# Patient Record
Sex: FEMALE | Race: WHITE | NOT HISPANIC OR LATINO | Employment: FULL TIME | URBAN - METROPOLITAN AREA
[De-identification: names, ages, dates, MRNs, and addresses within clinical notes are randomized per-mention and may not be internally consistent; named-entity substitution may affect disease eponyms.]

---

## 2020-12-18 ENCOUNTER — APPOINTMENT (OUTPATIENT)
Dept: RADIOLOGY | Facility: MEDICAL CENTER | Age: 35
End: 2020-12-18
Payer: COMMERCIAL

## 2021-01-08 ENCOUNTER — HOSPITAL ENCOUNTER (OUTPATIENT)
Dept: RADIOLOGY | Facility: MEDICAL CENTER | Age: 36
End: 2021-01-08
Attending: PHYSICIAN ASSISTANT | Admitting: PHYSICIAN ASSISTANT
Payer: COMMERCIAL

## 2021-01-08 DIAGNOSIS — R10.84 ABDOMINAL PAIN, GENERALIZED: ICD-10-CM

## 2021-01-08 DIAGNOSIS — K59.00 CONSTIPATION, UNSPECIFIED CONSTIPATION TYPE: ICD-10-CM

## 2021-01-08 DIAGNOSIS — K21.9 GASTROESOPHAGEAL REFLUX DISEASE, UNSPECIFIED WHETHER ESOPHAGITIS PRESENT: ICD-10-CM

## 2021-01-08 DIAGNOSIS — A08.11 EPIDEMIC VOMITING SYNDROME: ICD-10-CM

## 2021-01-08 DIAGNOSIS — K52.9 INFLAMMATORY BOWEL DISEASE: ICD-10-CM

## 2021-01-08 PROCEDURE — A9541 TC99M SULFUR COLLOID: HCPCS

## 2021-06-18 ENCOUNTER — HOSPITAL ENCOUNTER (OUTPATIENT)
Dept: LAB | Facility: MEDICAL CENTER | Age: 36
End: 2021-06-18
Attending: STUDENT IN AN ORGANIZED HEALTH CARE EDUCATION/TRAINING PROGRAM
Payer: COMMERCIAL

## 2021-08-20 ENCOUNTER — OFFICE VISIT (OUTPATIENT)
Dept: ENDOCRINOLOGY | Facility: MEDICAL CENTER | Age: 36
End: 2021-08-20
Attending: NURSE PRACTITIONER
Payer: COMMERCIAL

## 2021-08-20 VITALS
SYSTOLIC BLOOD PRESSURE: 136 MMHG | BODY MASS INDEX: 40.55 KG/M2 | DIASTOLIC BLOOD PRESSURE: 74 MMHG | HEIGHT: 66 IN | OXYGEN SATURATION: 95 % | RESPIRATION RATE: 16 BRPM | WEIGHT: 252.3 LBS | HEART RATE: 94 BPM

## 2021-08-20 DIAGNOSIS — D35.02 ADENOMA OF LEFT ADRENAL GLAND: ICD-10-CM

## 2021-08-20 DIAGNOSIS — E55.9 VITAMIN D DEFICIENCY: ICD-10-CM

## 2021-08-20 LAB
HBA1C MFR BLD: 5.1 % (ref 0–5.6)
INT CON NEG: NORMAL
INT CON POS: NORMAL

## 2021-08-20 PROCEDURE — 83036 HEMOGLOBIN GLYCOSYLATED A1C: CPT | Performed by: NURSE PRACTITIONER

## 2021-08-20 PROCEDURE — 99214 OFFICE O/P EST MOD 30 MIN: CPT | Performed by: NURSE PRACTITIONER

## 2021-08-20 PROCEDURE — 99212 OFFICE O/P EST SF 10 MIN: CPT | Performed by: NURSE PRACTITIONER

## 2021-08-20 RX ORDER — TOPIRAMATE 25 MG/1
25 TABLET ORAL
COMMUNITY
Start: 2021-06-20 | End: 2021-10-15

## 2021-08-20 RX ORDER — FLUTICASONE PROPIONATE 50 MCG
2 SPRAY, SUSPENSION (ML) NASAL
COMMUNITY
Start: 2021-06-28

## 2021-08-20 RX ORDER — ZONISAMIDE 100 MG/1
100 CAPSULE ORAL
COMMUNITY
Start: 2021-06-17

## 2021-08-20 RX ORDER — ALBUTEROL SULFATE 90 UG/1
AEROSOL, METERED RESPIRATORY (INHALATION)
COMMUNITY
Start: 2021-06-27 | End: 2022-09-16 | Stop reason: SDUPTHER

## 2021-08-20 RX ORDER — MONTELUKAST SODIUM 10 MG/1
10 TABLET ORAL
COMMUNITY
Start: 2021-05-29 | End: 2021-10-15

## 2021-08-20 RX ORDER — AMITRIPTYLINE HYDROCHLORIDE 25 MG/1
TABLET, FILM COATED ORAL
COMMUNITY
Start: 2021-08-11 | End: 2021-10-15 | Stop reason: SDUPTHER

## 2021-08-20 ASSESSMENT — PATIENT HEALTH QUESTIONNAIRE - PHQ9: CLINICAL INTERPRETATION OF PHQ2 SCORE: 0

## 2021-08-20 NOTE — PROGRESS NOTES
Chief Complaint: Initial consultation for adrenal lesion. Referred by Dr. Manzano.      HPI:     Priyanka Carmona is a 35 y.o. female here for initial consultation for adrenal lesion.      Patient reports increased anxiety and panic disorders, palpitations, cold intolerance, increased facial hair growth, weight fluctuates between losing and gaining. Average weight 250. Daily headaches-Referral to neurologist to r/o migraines and possible seizure activity.     Started and failed imitrex and topamax. Currently taking zonegran patient states she doesn't feel better but the headaches are less intense currently.     Menstrual cycle every 2 weeks for several months. Increased bleeding   Last pelvic exam was a year ago.     History of anxiety and panic disorders doubled with chronic migraines.     Patient reports history headaches, denies vision difficulties. Denies heat intolerance, diabetes or low potassium.     Labs 05/27/2020: TSH 0.77, potassium 4.4.   Labs completed 07/31/2020: Glucose 120    CT of Abd/Pelvis 09/11/2020: 3 cm hypodense solid left adrenal gland lesion more likely benign adenoma.     MRI Abd W &W/O completed at [facility] 11/13/2020: 2 cm x 2.6 cm benign left adrenal adenoma with benign fatty content.     POC A1c 08/20/2021: 5.1%.    Patient's medications, allergies, and social histories were reviewed and updated as appropriate.      ROS:       CONS:     No fever, no chills   EYES:     No diplopia, no blurry vision   CV:           No chest pain, no palpitations   PULM:     No SOB, no cough, no hemoptysis.   GI:            No nausea, no vomiting, no diarrhea, no constipation   ENDO:     No polyuria, no polydipsia, no heat intolerance, no cold intolerance     Past Medical History:  Problem List:  There are no relevant problems documented for this patient.      Past Surgical History:  No past surgical history on file.     Allergies:  Patient has no allergy information on record.     Social  "History:  Social History     Tobacco Use   • Smoking status: Never Smoker   • Smokeless tobacco: Never Used   Substance Use Topics   • Alcohol use: Never   • Drug use: Never        Family History:   family history is not on file.      PHYSICAL EXAM:   Vital signs: /74 (BP Location: Left arm, Patient Position: Sitting, BP Cuff Size: Large adult)   Pulse 94   Resp 16   Ht 1.676 m (5' 6\")   Wt 114 kg (252 lb 4.8 oz)   SpO2 95%   BMI 40.72 kg/m²   GENERAL: Well-developed, well-nourished in no apparent distress.   EYE:  No ocular asymmetry, PERRLA  HENT: Pink, moist mucous membranes.    NECK: No thyromegaly.   CARDIOVASCULAR:  No murmurs  LUNGS: Clear breath sounds  ABDOMEN: Soft, nontender   EXTREMITIES: No clubbing, cyanosis, or edema.   NEUROLOGICAL: No gross focal motor abnormalities   LYMPH: No cervical adenopathy palpated.   SKIN: No rashes, lesions.       Labs:  No results found for: WBC, RBC, HEMOGLOBIN, MCV, MCH, MCHC, RDW, MPV    No results found for: SODIUM, POTASSIUM, CHLORIDE, CO2, ANION, GLUCOSE, BUN, CREATININE, CALCIUM, ASTSGOT, ALTSGPT, TBILIRUBIN, ALBUMIN, TOTPROTEIN, GLOBULIN, AGRATIO    No results found for: TSHULTRASEN  No results found for: FREEDIR  No results found for: FREET3  No results found for: THYSTIMIG      Imaging:    ASSESSMENT/PLAN:   1. Adenoma of left adrenal gland  Stable.   BP slightly elevated.   Incidentaloma found in August 2020.   Conclusive adrenal work up was not started.     Recommend Complete Hormone Evaluation.   Future Lab Orders:    - T3 FREE; Future  - FREE THYROXINE; Future  - TSH; Future  - VITAMIN D,25 HYDROXY; Future  - THYROID PEROXIDASE  (TPO) AB; Future  - FOLATE; Future  - FERRITIN; Future  - PROLACTIN; Future  - CORTISOL; Future  - ESTRADIOL; Future  - FSH; Future  - IGF-1 SOMATOMEDIN; Future  - LUTEINIZING HORMONE SERUM; Future  - Comp Metabolic Panel; Future  - TESTOSTERONE, FREE AND TOTAL; Future  - ACTH; Future    2. Vitamin D deficiency  Unstable. "   Require current vitamin D level assay to assessment supplementation requirement.     Furture Order.   - VITAMIN D,25 HYDROXY; Future    Complete labs now.   Next appointment in 3 months.    Thank you kindly for allowing me to participate in the endocrine care plan for this patient.    Mayra Ferguson, APRN  08/20/21    CC:   Tori Manzano M.D. (Inactive)

## 2021-08-21 ENCOUNTER — HOSPITAL ENCOUNTER (OUTPATIENT)
Dept: LAB | Facility: MEDICAL CENTER | Age: 36
End: 2021-08-21
Payer: COMMERCIAL

## 2021-08-21 ENCOUNTER — HOSPITAL ENCOUNTER (OUTPATIENT)
Dept: LAB | Facility: MEDICAL CENTER | Age: 36
End: 2021-08-21
Attending: NURSE PRACTITIONER
Payer: COMMERCIAL

## 2021-08-21 DIAGNOSIS — D35.02 ADENOMA OF LEFT ADRENAL GLAND: ICD-10-CM

## 2021-08-21 DIAGNOSIS — E55.9 VITAMIN D DEFICIENCY: ICD-10-CM

## 2021-08-21 LAB
25(OH)D3 SERPL-MCNC: 16 NG/ML (ref 30–100)
ALBUMIN SERPL BCP-MCNC: 4.2 G/DL (ref 3.2–4.9)
ALBUMIN/GLOB SERPL: 1.6 G/DL
ALP SERPL-CCNC: 70 U/L (ref 30–99)
ALT SERPL-CCNC: 13 U/L (ref 2–50)
ANION GAP SERPL CALC-SCNC: 11 MMOL/L (ref 7–16)
AST SERPL-CCNC: 11 U/L (ref 12–45)
BILIRUB SERPL-MCNC: 0.2 MG/DL (ref 0.1–1.5)
BUN SERPL-MCNC: 14 MG/DL (ref 8–22)
CALCIUM SERPL-MCNC: 9.1 MG/DL (ref 8.5–10.5)
CHLORIDE SERPL-SCNC: 98 MMOL/L (ref 96–112)
CO2 SERPL-SCNC: 23 MMOL/L (ref 20–33)
CORTIS SERPL-MCNC: 4.1 UG/DL (ref 0–23)
CREAT SERPL-MCNC: 0.66 MG/DL (ref 0.5–1.4)
FERRITIN SERPL-MCNC: 75 NG/ML (ref 10–291)
FOLATE SERPL-MCNC: 10.6 NG/ML
FSH SERPL-ACNC: 5.4 MIU/ML
GLOBULIN SER CALC-MCNC: 2.7 G/DL (ref 1.9–3.5)
GLUCOSE SERPL-MCNC: 84 MG/DL (ref 65–99)
LH SERPL-ACNC: 3.2 IU/L
POTASSIUM SERPL-SCNC: 3.9 MMOL/L (ref 3.6–5.5)
PROLACTIN SERPL-MCNC: 8.36 NG/ML (ref 2.8–26)
PROT SERPL-MCNC: 6.9 G/DL (ref 6–8.2)
SODIUM SERPL-SCNC: 132 MMOL/L (ref 135–145)
T3FREE SERPL-MCNC: 2.86 PG/ML (ref 2–4.4)
T4 FREE SERPL-MCNC: 1.01 NG/DL (ref 0.93–1.7)
T4 FREE SERPL-MCNC: 1.02 NG/DL (ref 0.93–1.7)
TSH SERPL DL<=0.005 MIU/L-ACNC: 1.02 UIU/ML (ref 0.38–5.33)
TSH SERPL DL<=0.005 MIU/L-ACNC: 1.04 UIU/ML (ref 0.38–5.33)

## 2021-08-21 PROCEDURE — 82670 ASSAY OF TOTAL ESTRADIOL: CPT

## 2021-08-21 PROCEDURE — 83001 ASSAY OF GONADOTROPIN (FSH): CPT

## 2021-08-21 PROCEDURE — 80053 COMPREHEN METABOLIC PANEL: CPT

## 2021-08-21 PROCEDURE — 82728 ASSAY OF FERRITIN: CPT

## 2021-08-21 PROCEDURE — 84481 FREE ASSAY (FT-3): CPT

## 2021-08-21 PROCEDURE — 84443 ASSAY THYROID STIM HORMONE: CPT

## 2021-08-21 PROCEDURE — 82306 VITAMIN D 25 HYDROXY: CPT

## 2021-08-21 PROCEDURE — 84403 ASSAY OF TOTAL TESTOSTERONE: CPT

## 2021-08-21 PROCEDURE — 82746 ASSAY OF FOLIC ACID SERUM: CPT

## 2021-08-21 PROCEDURE — 82533 TOTAL CORTISOL: CPT

## 2021-08-21 PROCEDURE — 84439 ASSAY OF FREE THYROXINE: CPT | Mod: 91

## 2021-08-21 PROCEDURE — 84439 ASSAY OF FREE THYROXINE: CPT

## 2021-08-21 PROCEDURE — 84146 ASSAY OF PROLACTIN: CPT

## 2021-08-21 PROCEDURE — 84270 ASSAY OF SEX HORMONE GLOBUL: CPT

## 2021-08-21 PROCEDURE — 83002 ASSAY OF GONADOTROPIN (LH): CPT

## 2021-08-21 PROCEDURE — 82024 ASSAY OF ACTH: CPT

## 2021-08-21 PROCEDURE — 36415 COLL VENOUS BLD VENIPUNCTURE: CPT

## 2021-08-21 PROCEDURE — 86376 MICROSOMAL ANTIBODY EACH: CPT

## 2021-08-21 PROCEDURE — 84443 ASSAY THYROID STIM HORMONE: CPT | Mod: 91

## 2021-08-21 PROCEDURE — 84305 ASSAY OF SOMATOMEDIN: CPT

## 2021-08-21 PROCEDURE — 84402 ASSAY OF FREE TESTOSTERONE: CPT

## 2021-08-23 LAB
ACTH PLAS-MCNC: 7.1 PG/ML (ref 7.2–63.3)
ESTRADIOL SERPL-MCNC: 60 PG/ML
THYROPEROXIDASE AB SERPL-ACNC: 0.8 IU/ML (ref 0–9)

## 2021-08-24 LAB
IGF-I SERPL-MCNC: 172 NG/ML (ref 82–279)
IGF-I Z-SCORE SERPL: 0.2

## 2021-08-26 LAB
SHBG SERPL-SCNC: 51 NMOL/L (ref 30–135)
TESTOST FREE SERPL-MCNC: 2.2 PG/ML (ref 1.3–9.2)
TESTOST SERPL-MCNC: 17 NG/DL (ref 9–55)

## 2021-10-15 ENCOUNTER — OFFICE VISIT (OUTPATIENT)
Dept: MEDICAL GROUP | Facility: CLINIC | Age: 36
End: 2021-10-15
Payer: COMMERCIAL

## 2021-10-15 VITALS
OXYGEN SATURATION: 97 % | HEART RATE: 99 BPM | HEIGHT: 65 IN | DIASTOLIC BLOOD PRESSURE: 80 MMHG | BODY MASS INDEX: 43.65 KG/M2 | SYSTOLIC BLOOD PRESSURE: 110 MMHG | RESPIRATION RATE: 16 BRPM | TEMPERATURE: 96.8 F | WEIGHT: 262 LBS

## 2021-10-15 DIAGNOSIS — J45.20 MILD INTERMITTENT ASTHMA WITHOUT COMPLICATION: ICD-10-CM

## 2021-10-15 DIAGNOSIS — J30.9 CHRONIC ALLERGIC RHINITIS: ICD-10-CM

## 2021-10-15 DIAGNOSIS — G43.919 INTRACTABLE MIGRAINE WITHOUT STATUS MIGRAINOSUS, UNSPECIFIED MIGRAINE TYPE: ICD-10-CM

## 2021-10-15 DIAGNOSIS — D35.02 ADENOMA OF LEFT ADRENAL GLAND: ICD-10-CM

## 2021-10-15 DIAGNOSIS — G47.33 OBSTRUCTIVE SLEEP APNEA SYNDROME: ICD-10-CM

## 2021-10-15 DIAGNOSIS — K21.9 GASTROESOPHAGEAL REFLUX DISEASE WITHOUT ESOPHAGITIS: ICD-10-CM

## 2021-10-15 DIAGNOSIS — K58.2 IRRITABLE BOWEL SYNDROME WITH BOTH CONSTIPATION AND DIARRHEA: ICD-10-CM

## 2021-10-15 PROBLEM — K58.9 IBS (IRRITABLE BOWEL SYNDROME): Status: ACTIVE | Noted: 2021-10-15

## 2021-10-15 PROBLEM — T78.40XA ALLERGIES: Status: ACTIVE | Noted: 2021-10-15

## 2021-10-15 PROBLEM — G47.30 SLEEP APNEA: Status: ACTIVE | Noted: 2021-10-15

## 2021-10-15 PROBLEM — G43.909 MIGRAINE: Status: ACTIVE | Noted: 2021-10-15

## 2021-10-15 PROBLEM — T78.40XA ALLERGIES: Status: RESOLVED | Noted: 2021-10-15 | Resolved: 2021-10-15

## 2021-10-15 PROBLEM — J45.909 ASTHMA: Status: ACTIVE | Noted: 2021-10-15

## 2021-10-15 PROCEDURE — 99214 OFFICE O/P EST MOD 30 MIN: CPT | Mod: GC | Performed by: STUDENT IN AN ORGANIZED HEALTH CARE EDUCATION/TRAINING PROGRAM

## 2021-10-15 RX ORDER — AMITRIPTYLINE HYDROCHLORIDE 25 MG/1
25 TABLET, FILM COATED ORAL NIGHTLY PRN
Qty: 90 TABLET | Refills: 3 | Status: SHIPPED | OUTPATIENT
Start: 2021-10-15 | End: 2022-04-28

## 2021-10-15 RX ORDER — MONTELUKAST SODIUM 10 MG/1
10 TABLET ORAL DAILY
Qty: 90 TABLET | Refills: 3 | Status: SHIPPED | OUTPATIENT
Start: 2021-10-15 | End: 2022-09-16 | Stop reason: SDUPTHER

## 2021-10-15 NOTE — ASSESSMENT & PLAN NOTE
Followed by Dr. Ferguson with endocrinology.  This is likely a benign adenoma.  Screening labs including TSH, A1c, estrogen, testosterone, electrolytes, ferritin all within normal limits.  Has an upcoming appointment with endocrinology 11/5/2021. Continue to follow the recommendations

## 2021-10-15 NOTE — ASSESSMENT & PLAN NOTE
Continue as needed albuterol  Continue montelukast  If patient continues to need to use albuterol inhaler 3-day we will need to add a long-acting inhaled corticosteroid inhaler.  Follow-up in 6 months

## 2021-10-15 NOTE — ASSESSMENT & PLAN NOTE
Currently stable patient states she feels amitriptyline 25 mg has been helping.  Turn to clinic 6 months

## 2021-10-15 NOTE — PROGRESS NOTES
Subjective:     CC: follow up    HPI:   Priyanka presents today with :    Problem   Migraine    EEG 10/15 not resulted yet  Followed by Neurology -Dr. Vince Echeverria      Asthma    Has been using inhaler multiple times per day after a recent UTRI. However since she has gotten past the upper respiratory tract infection she has gone back to using the albuterol inhaler 3-4 times per week.     Ibs (Irritable Bowel Syndrome)    10/2019- EGD and colonoscopy done by [facility]   Gastritis and internal hemorrhoids.      Gerd (Gastroesophageal Reflux Disease)    Diagnosed on EGD 10/19     Chronic Allergic Rhinitis    Compliant with Flonase daily and montelukast daily. Worse in the fall.     Sleep Apnea    Complaint with CPAP      Adenoma of Left Adrenal Gland    CT of Abd/Pelvis 09/11/2020: 3 cm hypodense solid left adrenal gland lesion more likely benign adenoma.      MRI Abd W &W/O completed at [facility] 11/13/2020: 2 cm x 2.6 cm benign left adrenal adenoma with benign fatty content.      Allergies (Resolved)       Current Outpatient Medications Ordered in Epic   Medication Sig Dispense Refill   • montelukast (SINGULAIR) 10 MG Tab Take 1 Tablet by mouth every day. 90 Tablet 3   • amitriptyline (ELAVIL) 25 MG Tab Take 1 Tablet by mouth at bedtime as needed. 90 Tablet 3   • albuterol 108 (90 Base) MCG/ACT Aero Soln inhalation aerosol INHALE 1 PUFF BY MOUTH EVERY 4 HOURS AS NEEDED     • esomeprazole (NEXIUM) 20 MG capsule Take 20 mg by mouth.     • fluticasone (FLONASE) 50 MCG/ACT nasal spray 2 Sprays.     • zonisamide (ZONEGRAN) 100 MG Cap Take 100 mg by mouth.       No current Epic-ordered facility-administered medications on file.       Health Maintenance: Completed    ROS:  Gen: no fevers/chills, no changes in weight  Eyes: no changes in vision  ENT: no changes in hearing  Pulm: no sob, no cough  CV: no chest pain, no palpitations  GI: no nausea/vomiting, no diarrhea  MSk: no myalgias  Skin: no rash  Neuro: no  "headaches, no numbness/tingling      Objective:     Exam:  /80 (BP Location: Left arm)   Pulse 99   Temp 36 °C (96.8 °F)   Resp 16   Ht 1.651 m (5' 5\")   Wt 119 kg (262 lb)   SpO2 97%   Breastfeeding No   BMI 43.60 kg/m²  Body mass index is 43.6 kg/m².    Gen: Obese, flat affect, alert and oriented, No apparent distress.  Neck: Neck is supple without lymphadenopathy.  Lungs: Normal effort, CTA bilaterally, no wheezes, rhonchi, or rales  CV: Regular rate and rhythm. No murmurs, rubs, or gallops.  Ext: No clubbing, cyanosis, edema.    Labs: reviewed with patient     Assessment & Plan:     36 y.o. female with the following -     Problem List Items Addressed This Visit     Migraine     Continue to follow neurology recommendations. Continue amitriptyline 25 mg at night.         Relevant Medications    amitriptyline (ELAVIL) 25 MG Tab    Asthma     Continue as needed albuterol  Continue montelukast  If patient continues to need to use albuterol inhaler 3-day we will need to add a long-acting inhaled corticosteroid inhaler.  Follow-up in 6 months         Relevant Medications    montelukast (SINGULAIR) 10 MG Tab    IBS (irritable bowel syndrome)     Currently stable patient states she feels amitriptyline 25 mg has been helping.  Turn to clinic 6 months         GERD (gastroesophageal reflux disease)     Continue daily PPI.  Follow-up in 6 months         Chronic allergic rhinitis     Continue with Flonase and montelukast.   Follow-up 6 months           Relevant Medications    montelukast (SINGULAIR) 10 MG Tab    Sleep apnea     Continue to use CPAP daily.  Return to clinic 6 months         Adenoma of left adrenal gland     Followed by Dr. Ferguson with endocrinology.  This is likely a benign adenoma.  Screening labs including TSH, A1c, estrogen, testosterone, electrolytes, ferritin all within normal limits.  Has an upcoming appointment with endocrinology 11/5/2021. Continue to follow the recommendations         "         Return in about 6 months (around 4/15/2022).    Sakina Cid MD   PGY2    Please note that this dictation was created using voice recognition software. I have made every reasonable attempt to correct obvious errors, but I expect that there are errors of grammar and possibly content that I did not discover before finalizing the note.

## 2021-11-05 ENCOUNTER — APPOINTMENT (OUTPATIENT)
Dept: ENDOCRINOLOGY | Facility: MEDICAL CENTER | Age: 36
End: 2021-11-05
Attending: NURSE PRACTITIONER
Payer: COMMERCIAL

## 2022-03-04 ENCOUNTER — HOSPITAL ENCOUNTER (OUTPATIENT)
Dept: RADIOLOGY | Facility: MEDICAL CENTER | Age: 37
End: 2022-03-04
Attending: NURSE PRACTITIONER
Payer: COMMERCIAL

## 2022-03-04 VITALS — WEIGHT: 257.5 LBS | HEIGHT: 66 IN | BODY MASS INDEX: 41.38 KG/M2

## 2022-03-04 DIAGNOSIS — M54.2 CERVICALGIA: ICD-10-CM

## 2022-03-04 DIAGNOSIS — M79.601 RIGHT UPPER LIMB PAIN: ICD-10-CM

## 2022-03-04 DIAGNOSIS — M79.602 LEFT ARM PAIN: ICD-10-CM

## 2022-03-04 DIAGNOSIS — R27.0 ATAXIA: ICD-10-CM

## 2022-03-04 PROCEDURE — 72141 MRI NECK SPINE W/O DYE: CPT

## 2022-03-09 ENCOUNTER — OFFICE VISIT (OUTPATIENT)
Dept: ENDOCRINOLOGY | Facility: MEDICAL CENTER | Age: 37
End: 2022-03-09
Attending: NURSE PRACTITIONER
Payer: COMMERCIAL

## 2022-03-09 VITALS
HEART RATE: 94 BPM | BODY MASS INDEX: 42.43 KG/M2 | SYSTOLIC BLOOD PRESSURE: 122 MMHG | HEIGHT: 66 IN | WEIGHT: 264 LBS | DIASTOLIC BLOOD PRESSURE: 88 MMHG | OXYGEN SATURATION: 99 %

## 2022-03-09 DIAGNOSIS — D35.02 ADENOMA OF LEFT ADRENAL GLAND: ICD-10-CM

## 2022-03-09 DIAGNOSIS — E55.9 VITAMIN D DEFICIENCY: ICD-10-CM

## 2022-03-09 PROCEDURE — 99211 OFF/OP EST MAY X REQ PHY/QHP: CPT | Performed by: NURSE PRACTITIONER

## 2022-03-09 PROCEDURE — 99214 OFFICE O/P EST MOD 30 MIN: CPT | Performed by: NURSE PRACTITIONER

## 2022-03-09 RX ORDER — DEXAMETHASONE 1 MG
1 TABLET ORAL
Qty: 2 TABLET | Refills: 0 | Status: SHIPPED | OUTPATIENT
Start: 2022-03-09 | End: 2022-04-28

## 2022-03-09 NOTE — PROGRESS NOTES
Chief Complaint: Follow-up evaluation for adrenal lesion.      HPI:     Priyanka Carmona is a 36 y.o. female here for initial consultation for adrenal lesion.      Patient reports increased anxiety and panic disorders, palpitations, cold intolerance, increased facial hair growth, weight fluctuates between losing and gaining. Average weight 250. Daily headaches-Patient has established with Neurology for ongoing headaches.     Started and failed imitrex and topamax. Currently taking zonegran patient states she doesn't feel better but the headaches are less intense currently.     Menstrual cycle every 2 weeks for several months. Increased bleeding   Last pelvic exam was a year ago.     Patient reports intermittent headaches, denies vision difficulties. Denies heat intolerance, diabetes or low potassium.     POC A1c 08/20/2021: 5.1%.     Ref. Range 8/21/2021 11:16   Potassium Latest Ref Range: 3.6 - 5.5 mmol/L 3.9      Ref. Range 8/21/2021 11:16   Glucose Latest Ref Range: 65 - 99 mg/dL 84     Morning cortisol levels drawn after 11 AM:     Ref. Range 8/21/2021 11:16   Cortisol Latest Ref Range: 0.0 - 23.0 ug/dL 4.1      Ref. Range 8/21/2021 11:17   Acth Latest Ref Range: 7.2 - 63.3 pg/mL 7.1 (L)       CT of Abd/Pelvis 09/11/2020: 3 cm hypodense solid left adrenal gland lesion more likely benign adenoma.     MRI Abd W &W/O completed at [facility] 11/13/2020: 2 cm x 2.6 cm benign left adrenal adenoma with benign fatty content.     Thyroid function within normal limit:     Ref. Range 8/21/2021 11:16   TSH Latest Ref Range: 0.380 - 5.330 uIU/mL 1.040   Free T-4 Latest Ref Range: 0.93 - 1.70 ng/dL 1.01   T3,Free Latest Ref Range: 2.00 - 4.40 pg/mL 2.86      Ref. Range 8/21/2021 11:16   Microsomal -Tpo- Abs Latest Ref Range: 0.0 - 9.0 IU/mL 0.8        Ref. Range 8/21/2021 11:16   Estradiol-E2 Latest Units: pg/mL 60   Follicle Stimulating Hormone Latest Units: mIU/mL 5.4   IGF1 Latest Ref Range: 82 - 279 ng/mL 172  "  IGF-1 Z Score Calculation Unknown 0.2   Luteinizing Hormone Latest Units: IU/L 3.2   Prolactin Latest Ref Range: 2.80 - 26.00 ng/mL 8.36   Sex Hormone Bind Globulin Latest Ref Range: 30 - 135 nmol/L 51   Testosterone Fr LCMS Latest Ref Range: 1.3 - 9.2 pg/mL 2.2   Testosterone,Total Latest Ref Range: 9 - 55 ng/dL 17       Patient's medications, allergies, and social histories were reviewed and updated as appropriate.      ROS:       CONS:     No fever, no chills   EYES:     No diplopia, no blurry vision   CV:           No chest pain, no palpitations   PULM:     No SOB, no cough, no hemoptysis.   GI:            No nausea, no vomiting, no diarrhea, no constipation   ENDO:     No polyuria, no polydipsia, no heat intolerance, no cold intolerance     Past Medical History:  Problem List:  2021-10: Migraine  2021-10: Asthma  2021-10: IBS (irritable bowel syndrome)  2021-10: GERD (gastroesophageal reflux disease)  2021-10: Allergies  2021-10: Chronic allergic rhinitis  2021-10: Sleep apnea  2021-10: Adenoma of left adrenal gland      Past Surgical History:  No past surgical history on file.     Allergies:  Patient has no allergy information on record.     Social History:  Social History     Tobacco Use   • Smoking status: Never Smoker   • Smokeless tobacco: Never Used   Substance Use Topics   • Alcohol use: Never   • Drug use: Never        Family History:   family history is not on file.      PHYSICAL EXAM:   Vital signs: /88 (BP Location: Left arm, Patient Position: Sitting, BP Cuff Size: Adult)   Pulse 94   Ht 1.676 m (5' 6\")   Wt 120 kg (264 lb)   SpO2 99%   BMI 42.61 kg/m²   GENERAL: Well-developed, well-nourished in no apparent distress.   EYE:  No ocular asymmetry, PERRLA  HENT: Pink, moist mucous membranes.    NECK: No thyromegaly.   CARDIOVASCULAR:  No murmurs  LUNGS: Clear breath sounds  ABDOMEN: Soft, nontender   EXTREMITIES: No clubbing, cyanosis, or edema.   NEUROLOGICAL: No gross focal motor " abnormalities   LYMPH: No cervical adenopathy palpated.   SKIN: No rashes, lesions.    ASSESSMENT/PLAN:     1. Adenoma of left adrenal gland  Stable.   Incidentaloma found in August 2020.   Recommend dexamethasone stim test to include fasting cortisol and dexamethasone level.  No distal    Patient instructed to take dexamethasone 1 mg at 1130 the night before cortisol testing.  Cortisol level should be drawn before 9 AM.      2. Vitamin D deficiency  Unstable.   Recommend vitamin D 4000 to 5000 IU daily.      Complete stim test prior to next appointment.  Next appointment in 2 months.    Thank you kindly for allowing me to participate in the endocrine care plan for this patient.    Mayra Ferguson, APRN  03/09/2022      CC:   Tori Manzano M.D. (Inactive)

## 2022-03-16 PROBLEM — K52.9 CHRONIC DIARRHEA: Status: ACTIVE | Noted: 2021-04-16

## 2022-03-16 PROBLEM — R06.83 SNORING: Status: ACTIVE | Noted: 2021-04-16

## 2022-03-16 PROBLEM — F41.9 ANXIETY: Status: ACTIVE | Noted: 2021-05-28

## 2022-03-16 PROBLEM — G47.33 OBSTRUCTIVE SLEEP APNEA: Status: ACTIVE | Noted: 2021-06-25

## 2022-03-16 PROBLEM — G43.909 MIGRAINE: Status: ACTIVE | Noted: 2021-04-16

## 2022-03-16 PROBLEM — R53.83 FATIGUE: Status: ACTIVE | Noted: 2021-06-25

## 2022-03-16 PROBLEM — R56.9 SEIZURES (HCC): Status: ACTIVE | Noted: 2021-04-16

## 2022-03-16 PROBLEM — J35.1 HYPERTROPHY OF TONSILS: Status: ACTIVE | Noted: 2021-04-16

## 2022-03-16 RX ORDER — DIAZEPAM 5 MG/1
TABLET ORAL
COMMUNITY
Start: 2022-02-09 | End: 2022-04-28

## 2022-03-16 RX ORDER — SUMATRIPTAN 50 MG/1
TABLET, FILM COATED ORAL
COMMUNITY
Start: 2022-02-18

## 2022-04-28 ENCOUNTER — OFFICE VISIT (OUTPATIENT)
Dept: MEDICAL GROUP | Facility: CLINIC | Age: 37
End: 2022-04-28
Payer: COMMERCIAL

## 2022-04-28 VITALS
WEIGHT: 267 LBS | SYSTOLIC BLOOD PRESSURE: 142 MMHG | HEIGHT: 66 IN | OXYGEN SATURATION: 93 % | BODY MASS INDEX: 42.91 KG/M2 | HEART RATE: 60 BPM | DIASTOLIC BLOOD PRESSURE: 92 MMHG

## 2022-04-28 DIAGNOSIS — F41.9 ANXIETY: ICD-10-CM

## 2022-04-28 DIAGNOSIS — R59.0 CERVICAL LYMPHADENOPATHY: ICD-10-CM

## 2022-04-28 DIAGNOSIS — D35.02 ADENOMA OF LEFT ADRENAL GLAND: ICD-10-CM

## 2022-04-28 DIAGNOSIS — R10.13 DYSPEPSIA: ICD-10-CM

## 2022-04-28 DIAGNOSIS — K21.9 GASTROESOPHAGEAL REFLUX DISEASE WITHOUT ESOPHAGITIS: ICD-10-CM

## 2022-04-28 DIAGNOSIS — G62.9 NEUROPATHY: ICD-10-CM

## 2022-04-28 PROCEDURE — 99214 OFFICE O/P EST MOD 30 MIN: CPT | Performed by: STUDENT IN AN ORGANIZED HEALTH CARE EDUCATION/TRAINING PROGRAM

## 2022-04-28 RX ORDER — DULOXETIN HYDROCHLORIDE 30 MG/1
30 CAPSULE, DELAYED RELEASE ORAL DAILY
COMMUNITY
Start: 2022-03-14 | End: 2022-04-28 | Stop reason: SDUPTHER

## 2022-04-28 RX ORDER — GABAPENTIN 300 MG/1
300 CAPSULE ORAL 2 TIMES DAILY
COMMUNITY
Start: 2022-03-11 | End: 2022-09-16

## 2022-04-28 RX ORDER — DULOXETIN HYDROCHLORIDE 30 MG/1
30 CAPSULE, DELAYED RELEASE ORAL DAILY
Qty: 30 CAPSULE | Refills: 1 | Status: SHIPPED | OUTPATIENT
Start: 2022-04-28 | End: 2022-07-11

## 2022-04-28 ASSESSMENT — ENCOUNTER SYMPTOMS
SPEECH CHANGE: 0
EYE DISCHARGE: 0
NERVOUS/ANXIOUS: 1
SENSORY CHANGE: 1
HEARTBURN: 1
EYE REDNESS: 0
DIARRHEA: 1
DIZZINESS: 1
NAUSEA: 1
FEVER: 0
CHILLS: 0
SHORTNESS OF BREATH: 0
WEIGHT LOSS: 0
FOCAL WEAKNESS: 0
ABDOMINAL PAIN: 1
HEADACHES: 1
SORE THROAT: 0

## 2022-04-29 ENCOUNTER — HOSPITAL ENCOUNTER (OUTPATIENT)
Dept: LAB | Facility: MEDICAL CENTER | Age: 37
End: 2022-04-29
Attending: STUDENT IN AN ORGANIZED HEALTH CARE EDUCATION/TRAINING PROGRAM
Payer: COMMERCIAL

## 2022-04-29 ENCOUNTER — HOSPITAL ENCOUNTER (OUTPATIENT)
Dept: LAB | Facility: MEDICAL CENTER | Age: 37
End: 2022-04-29
Attending: NURSE PRACTITIONER
Payer: COMMERCIAL

## 2022-04-29 DIAGNOSIS — D35.02 ADENOMA OF LEFT ADRENAL GLAND: ICD-10-CM

## 2022-04-29 LAB
CORTIS SERPL-MCNC: 1.6 UG/DL (ref 0–23)
DHEA-S SERPL-MCNC: 93.4 UG/DL (ref 60.9–337)
T3FREE SERPL-MCNC: 3 PG/ML (ref 2–4.4)
T4 FREE SERPL-MCNC: 1 NG/DL (ref 0.93–1.7)
TSH SERPL DL<=0.005 MIU/L-ACNC: 1.34 UIU/ML (ref 0.38–5.33)

## 2022-04-29 PROCEDURE — 36415 COLL VENOUS BLD VENIPUNCTURE: CPT

## 2022-04-29 PROCEDURE — 82024 ASSAY OF ACTH: CPT

## 2022-04-29 PROCEDURE — 83013 H PYLORI (C-13) BREATH: CPT

## 2022-04-29 PROCEDURE — 82088 ASSAY OF ALDOSTERONE: CPT

## 2022-04-29 PROCEDURE — 84481 FREE ASSAY (FT-3): CPT

## 2022-04-29 PROCEDURE — 80299 QUANTITATIVE ASSAY DRUG: CPT

## 2022-04-29 PROCEDURE — 84439 ASSAY OF FREE THYROXINE: CPT

## 2022-04-29 PROCEDURE — 84443 ASSAY THYROID STIM HORMONE: CPT

## 2022-04-29 PROCEDURE — 84244 ASSAY OF RENIN: CPT

## 2022-04-29 PROCEDURE — 82627 DEHYDROEPIANDROSTERONE: CPT

## 2022-04-29 PROCEDURE — 83835 ASSAY OF METANEPHRINES: CPT

## 2022-04-29 PROCEDURE — 82384 ASSAY THREE CATECHOLAMINES: CPT

## 2022-04-29 PROCEDURE — 82533 TOTAL CORTISOL: CPT

## 2022-04-29 NOTE — ASSESSMENT & PLAN NOTE
Labs as ordered by endocrinology  Follow-up with endocrinology, new referral placed  Review laboratory evaluation and plan with endocrinology.  May need further assessment of potential underlying PCOS which would not likely be related to the adrenal adenoma.

## 2022-04-29 NOTE — ASSESSMENT & PLAN NOTE
Continue Cymbalta 30 mg daily  Advised patient that we could either increase dose or try a different medication for anxiety if she feels that she is only getting partial relief.  Continue self-care activities

## 2022-04-29 NOTE — ASSESSMENT & PLAN NOTE
Discontinue PPI for 2 to 4 weeks and then have H. pylori breath test done.  May resume PPI after the test has been completed while awaiting results.

## 2022-04-29 NOTE — PROGRESS NOTES
Subjective:     CC: Follow-up on several issues    HPI:   Priyanka presents today with    Problem   Dyspepsia    Patient reports abdominal bloating and epigastric abdominal pain associated with reflux symptoms.  She has not been tested for H. pylori.     Neuropathy    Paresthesia and anesthesia of bilateral upper and lower extremities.  This bothers patient daily.  She had EMG which showed bilateral carpal tunnel, but was not consistent with full pattern of symptoms.  She is taking vitamin D and B12 supplements.       Cervical Lymphadenopathy    Patient has noted palpable cervical lymph nodes bilaterally for several months.  She recently had a cervical spine MRI which also showed nonspecific cervical lymph nodes.     Gerd (Gastroesophageal Reflux Disease)    Diagnosed on EGD 10/19  Currently taking Nexium and has made dietary changes, avoiding trigger foods.  She does report that she often has bloating, epigastric abdominal pain, and dyspepsia.     Adenoma of Left Adrenal Gland    CT of Abd/Pelvis 09/11/2020: 3 cm hypodense solid left adrenal gland lesion more likely benign adenoma.      MRI Abd W &W/O completed at [facility] 11/13/2020: 2 cm x 2.6 cm benign left adrenal adenoma with benign fatty content.     Following with endocrinology, has orders for lab work to be drawn tomorrow for further evaluation.  Patient has upcoming follow-up appointment with Endo on May 11.  However her endocrinologist will no longer be seeing patients in the area following this and patient requests initiation of new referral process.     Anxiety    Patient connected with therapist via online since last appointment.  She recently started 30 mg daily of Cymbalta.  She thinks she might have slightly more fatigue, but otherwise is tolerating this medication well.  She does think that this medication has significantly improved her anxiety symptoms.  However, she has not noticed improvement in any of her somatic complaints.    "      ROS:  Review of Systems   Constitutional: Negative for chills, fever and weight loss.   HENT: Negative for congestion and sore throat.    Eyes: Negative for discharge and redness.   Respiratory: Negative for shortness of breath.    Cardiovascular: Positive for leg swelling. Negative for chest pain.   Gastrointestinal: Positive for abdominal pain, diarrhea, heartburn and nausea.   Skin: Negative for rash.   Neurological: Positive for dizziness, sensory change and headaches. Negative for speech change and focal weakness.   Psychiatric/Behavioral: The patient is nervous/anxious.        Objective:     Exam:  /92   Pulse 60   Ht 1.676 m (5' 6\")   Wt 121 kg (267 lb)   SpO2 93%   BMI 43.09 kg/m²  Body mass index is 43.09 kg/m².    Physical Exam  Constitutional:       General: She is not in acute distress.     Appearance: Normal appearance.   HENT:      Head: Normocephalic and atraumatic.      Right Ear: External ear normal.      Left Ear: External ear normal.      Nose: Nose normal.   Eyes:      Conjunctiva/sclera: Conjunctivae normal.      Pupils: Pupils are equal, round, and reactive to light.   Cardiovascular:      Rate and Rhythm: Normal rate and regular rhythm.      Pulses: Normal pulses.      Heart sounds: Normal heart sounds. No murmur heard.  Pulmonary:      Effort: Pulmonary effort is normal. No respiratory distress.      Breath sounds: Normal breath sounds. No wheezing, rhonchi or rales.   Abdominal:      General: There is no distension.      Palpations: Abdomen is soft.   Musculoskeletal:         General: No deformity.      Cervical back: Neck supple. No tenderness.      Comments: Mild bilateral and symmetric nonpitting lower extremity edema   Lymphadenopathy:      Cervical: Cervical adenopathy present.   Skin:     General: Skin is dry.      Capillary Refill: Capillary refill takes less than 2 seconds.      Findings: No erythema or rash.   Neurological:      General: No focal deficit present.    "   Mental Status: She is alert.      Sensory: No sensory deficit.      Motor: No weakness.      Gait: Gait normal.           Assessment & Plan:     36 y.o. female with the following -     Problem List Items Addressed This Visit     GERD (gastroesophageal reflux disease)     Associated with dyspepsia.  Taking PPI.  Has not had evaluation for H. pylori infection.         Relevant Orders    H. PYLORI BREATH TEST    Adenoma of left adrenal gland     Labs as ordered by endocrinology  Follow-up with endocrinology, new referral placed  Review laboratory evaluation and plan with endocrinology.  May need further assessment of potential underlying PCOS which would not likely be related to the adrenal adenoma.         Relevant Orders    Referral to Endocrinology    Anxiety     Continue Cymbalta 30 mg daily  Advised patient that we could either increase dose or try a different medication for anxiety if she feels that she is only getting partial relief.  Continue self-care activities         Relevant Medications    DULoxetine (CYMBALTA) 30 MG Cap DR Particles    Dyspepsia     Discontinue PPI for 2 to 4 weeks and then have H. pylori breath test done.  May resume PPI after the test has been completed while awaiting results.         Relevant Orders    H. PYLORI BREATH TEST    Neuropathy     Refer to PM&R for further evaluation of peripheral neuropathy.  Advised patient to bring EMG study report to her first appointment.         Relevant Medications    gabapentin (NEURONTIN) 300 MG Cap    DULoxetine (CYMBALTA) 30 MG Cap DR Particles    Other Relevant Orders    Referral to Pain Clinic    Cervical lymphadenopathy     Soft tissue ultrasound to further assess bilateral anterior cervical lymph nodes         Relevant Orders    US-SOFT TISSUES OF HEAD - NECK            -Advised patient to get labs and imaging studies scheduled and then to schedule follow-up visit with me after this to review results.    Spent approximately 30 to 35 minutes  on this encounter with more than 50% of that time face-to-face providing counseling and/or coordination of care.    Tori Manzano MD

## 2022-04-30 LAB — ALDOST SERPL-MCNC: 6.3 NG/DL

## 2022-05-01 LAB — UREA BREATH TEST QL: NEGATIVE

## 2022-05-03 LAB — RENIN PLAS-CCNC: <0.1 NG/ML/HR

## 2022-05-04 LAB
ACTH PLAS-MCNC: <1.5 PG/ML (ref 7.2–63.3)
METANEPHS SERPL-SCNC: <0.1 NMOL/L (ref 0–0.49)
NORMETANEPHRINE SERPL-SCNC: 0.38 NMOL/L (ref 0–0.89)

## 2022-05-05 LAB — TEST NAME 95000: NORMAL

## 2022-05-06 ENCOUNTER — HOSPITAL ENCOUNTER (OUTPATIENT)
Dept: RADIOLOGY | Facility: MEDICAL CENTER | Age: 37
End: 2022-05-06
Attending: STUDENT IN AN ORGANIZED HEALTH CARE EDUCATION/TRAINING PROGRAM
Payer: COMMERCIAL

## 2022-05-06 DIAGNOSIS — R59.0 CERVICAL LYMPHADENOPATHY: ICD-10-CM

## 2022-05-06 PROCEDURE — 76536 US EXAM OF HEAD AND NECK: CPT

## 2022-05-08 LAB
DOPAMINE SERPL-MCNC: <20 PG/ML (ref 0–20)
EPINEPH PLAS-MCNC: <10 PG/ML (ref 10–200)
NOREPINEPH PLAS-MCNC: 240 PG/ML (ref 80–520)

## 2022-05-10 DIAGNOSIS — R59.0 CERVICAL LYMPHADENOPATHY: ICD-10-CM

## 2022-05-13 ENCOUNTER — HOSPITAL ENCOUNTER (OUTPATIENT)
Dept: LAB | Facility: MEDICAL CENTER | Age: 37
End: 2022-05-13
Attending: STUDENT IN AN ORGANIZED HEALTH CARE EDUCATION/TRAINING PROGRAM
Payer: COMMERCIAL

## 2022-05-13 ENCOUNTER — OFFICE VISIT (OUTPATIENT)
Dept: ENDOCRINOLOGY | Facility: MEDICAL CENTER | Age: 37
End: 2022-05-13
Attending: NURSE PRACTITIONER
Payer: COMMERCIAL

## 2022-05-13 ENCOUNTER — HOSPITAL ENCOUNTER (OUTPATIENT)
Dept: RADIOLOGY | Facility: MEDICAL CENTER | Age: 37
End: 2022-05-13
Attending: STUDENT IN AN ORGANIZED HEALTH CARE EDUCATION/TRAINING PROGRAM
Payer: COMMERCIAL

## 2022-05-13 ENCOUNTER — OFFICE VISIT (OUTPATIENT)
Dept: PHYSICAL MEDICINE AND REHAB | Facility: MEDICAL CENTER | Age: 37
End: 2022-05-13
Payer: COMMERCIAL

## 2022-05-13 VITALS
DIASTOLIC BLOOD PRESSURE: 72 MMHG | WEIGHT: 257 LBS | SYSTOLIC BLOOD PRESSURE: 124 MMHG | BODY MASS INDEX: 41.3 KG/M2 | OXYGEN SATURATION: 93 % | HEART RATE: 100 BPM | HEIGHT: 66 IN

## 2022-05-13 VITALS
WEIGHT: 257.72 LBS | HEART RATE: 86 BPM | BODY MASS INDEX: 41.42 KG/M2 | SYSTOLIC BLOOD PRESSURE: 110 MMHG | HEIGHT: 66 IN | OXYGEN SATURATION: 97 % | TEMPERATURE: 97.9 F | DIASTOLIC BLOOD PRESSURE: 80 MMHG

## 2022-05-13 DIAGNOSIS — M50.30 DEGENERATION OF CERVICAL INTERVERTEBRAL DISC: ICD-10-CM

## 2022-05-13 DIAGNOSIS — M54.2 CERVICALGIA: ICD-10-CM

## 2022-05-13 DIAGNOSIS — R59.0 CERVICAL LYMPHADENOPATHY: ICD-10-CM

## 2022-05-13 DIAGNOSIS — M79.18 MYOFASCIAL PAIN: ICD-10-CM

## 2022-05-13 DIAGNOSIS — R20.2 PARESTHESIAS: ICD-10-CM

## 2022-05-13 DIAGNOSIS — R94.6 ABNORMAL THYROID FUNCTION TEST: ICD-10-CM

## 2022-05-13 DIAGNOSIS — M79.18 MUSCULOSKELETAL PAIN: ICD-10-CM

## 2022-05-13 DIAGNOSIS — E55.9 VITAMIN D DEFICIENCY: ICD-10-CM

## 2022-05-13 DIAGNOSIS — D35.00 INCIDENTAL ADRENAL CORTICAL ADENOMA: ICD-10-CM

## 2022-05-13 LAB
BASOPHILS # BLD AUTO: 0.4 % (ref 0–1.8)
BASOPHILS # BLD: 0.04 K/UL (ref 0–0.12)
EOSINOPHIL # BLD AUTO: 0.23 K/UL (ref 0–0.51)
EOSINOPHIL NFR BLD: 2.3 % (ref 0–6.9)
ERYTHROCYTE [DISTWIDTH] IN BLOOD BY AUTOMATED COUNT: 42.5 FL (ref 35.9–50)
HCT VFR BLD AUTO: 40.8 % (ref 37–47)
HGB BLD-MCNC: 13.7 G/DL (ref 12–16)
IMM GRANULOCYTES # BLD AUTO: 0.1 K/UL (ref 0–0.11)
IMM GRANULOCYTES NFR BLD AUTO: 1 % (ref 0–0.9)
LYMPHOCYTES # BLD AUTO: 2.35 K/UL (ref 1–4.8)
LYMPHOCYTES NFR BLD: 23.6 % (ref 22–41)
MCH RBC QN AUTO: 31.6 PG (ref 27–33)
MCHC RBC AUTO-ENTMCNC: 33.6 G/DL (ref 33.6–35)
MCV RBC AUTO: 94 FL (ref 81.4–97.8)
MONOCYTES # BLD AUTO: 0.68 K/UL (ref 0–0.85)
MONOCYTES NFR BLD AUTO: 6.8 % (ref 0–13.4)
NEUTROPHILS # BLD AUTO: 6.56 K/UL (ref 2–7.15)
NEUTROPHILS NFR BLD: 65.9 % (ref 44–72)
NRBC # BLD AUTO: 0 K/UL
NRBC BLD-RTO: 0 /100 WBC
PLATELET # BLD AUTO: 265 K/UL (ref 164–446)
PMV BLD AUTO: 9.8 FL (ref 9–12.9)
RBC # BLD AUTO: 4.34 M/UL (ref 4.2–5.4)
WBC # BLD AUTO: 10 K/UL (ref 4.8–10.8)

## 2022-05-13 PROCEDURE — 99204 OFFICE O/P NEW MOD 45 MIN: CPT | Performed by: PHYSICAL MEDICINE & REHABILITATION

## 2022-05-13 PROCEDURE — 85025 COMPLETE CBC W/AUTO DIFF WBC: CPT

## 2022-05-13 PROCEDURE — 36415 COLL VENOUS BLD VENIPUNCTURE: CPT

## 2022-05-13 PROCEDURE — 99211 OFF/OP EST MAY X REQ PHY/QHP: CPT | Performed by: NURSE PRACTITIONER

## 2022-05-13 PROCEDURE — 99214 OFFICE O/P EST MOD 30 MIN: CPT | Performed by: NURSE PRACTITIONER

## 2022-05-13 PROCEDURE — 71046 X-RAY EXAM CHEST 2 VIEWS: CPT

## 2022-05-13 RX ORDER — LEVOTHYROXINE SODIUM 0.03 MG/1
25 TABLET ORAL
Qty: 90 TABLET | Refills: 3 | Status: SHIPPED | OUTPATIENT
Start: 2022-05-13

## 2022-05-13 ASSESSMENT — PATIENT HEALTH QUESTIONNAIRE - PHQ9
SUM OF ALL RESPONSES TO PHQ QUESTIONS 1-9: 3
5. POOR APPETITE OR OVEREATING: 0 - NOT AT ALL
CLINICAL INTERPRETATION OF PHQ2 SCORE: 1

## 2022-05-13 ASSESSMENT — PAIN SCALES - GENERAL: PAINLEVEL: 2=MINIMAL-SLIGHT

## 2022-05-13 NOTE — PROGRESS NOTES
Chief Complaint: Follow-up evaluation for adrenal lesion.      HPI:     Priyanka Carmona is a 36 y.o. female here for initial consultation for adrenal lesion.      Patient reports increased anxiety and panic disorders, palpitations, cold intolerance, increased facial hair growth, weight fluctuates between losing and gaining. Average weight 250. Daily headaches-Patient has established with Neurology for ongoing headaches.     Started and failed imitrex and topamax. Currently taking zonegran patient states she doesn't feel better but the headaches are less intense currently.     Menstrual cycle every 2 weeks for several months.Last pelvic exam was a year ago. Recently her menstrual cycle changed and is now occurring every 5-6 weeks.     Patient has had increased levels of stress at home and physically with ongoing chronic neck pain and tenderness to the lymph nodes under her chin.     Patient reports intermittent headaches, denies vision difficulties. Denies heat intolerance, diabetes or low potassium.     CT of Abd/Pelvis 09/11/2020: 3 cm hypodense solid left adrenal gland lesion more likely benign adenoma.     MRI Abd W &W/O completed at [facility] 11/13/2020: 2 cm x 2.6 cm benign left adrenal adenoma with benign fatty content.     Dexamethasone Suppression Test was completed 04/29/2022:   Dexa level > 412     Latest Reference Range & Units 04/29/22 07:01   Cortisol 0.0 - 23.0 ug/dL 1.6 [1]      Latest Reference Range & Units 04/29/22 07:01   Acth 7.2 - 63.3 pg/mL <1.5 (L) [1]   Aldos Serum ng/dL 6.3 [2]   Dhea-S 60.9 - 337.0 ug/dL 93.4   Renin Activity ng/mL/hr <0.1 [3]        Latest Reference Range & Units 04/29/22 07:01   Metanephrine Plasma 0.00 - 0.49 nmol/L <0.10   Normetanephrine Plasma 0.00 - 0.89 nmol/L 0.38       POC A1c 08/20/2021: 5.1%.      FT4 has been on the low side of normal for consecutive draws.      Latest Reference Range & Units 04/29/22 07:01   TSH 0.380 - 5.330 uIU/mL 1.340 [1]   Free  T-4 0.93 - 1.70 ng/dL 1.00   T3,Free 2.00 - 4.40 pg/mL 3.00        Ref. Range 8/21/2021 11:16   TSH Latest Ref Range: 0.380 - 5.330 uIU/mL 1.040   Free T-4 Latest Ref Range: 0.93 - 1.70 ng/dL 1.01   T3,Free Latest Ref Range: 2.00 - 4.40 pg/mL 2.86      Ref. Range 8/21/2021 11:16   Microsomal -Tpo- Abs Latest Ref Range: 0.0 - 9.0 IU/mL 0.8        Ref. Range 8/21/2021 11:16   Estradiol-E2 Latest Units: pg/mL 60   Follicle Stimulating Hormone Latest Units: mIU/mL 5.4   IGF1 Latest Ref Range: 82 - 279 ng/mL 172   IGF-1 Z Score Calculation Unknown 0.2   Luteinizing Hormone Latest Units: IU/L 3.2   Prolactin Latest Ref Range: 2.80 - 26.00 ng/mL 8.36   Sex Hormone Bind Globulin Latest Ref Range: 30 - 135 nmol/L 51   Testosterone Fr LCMS Latest Ref Range: 1.3 - 9.2 pg/mL 2.2   Testosterone,Total Latest Ref Range: 9 - 55 ng/dL 17       Patient's medications, allergies, and social histories were reviewed and updated as appropriate.      ROS:       CONS:     No fever, no chills   EYES:     No diplopia, no blurry vision   CV:           No chest pain, no palpitations   PULM:     No SOB, no cough, no hemoptysis.   GI:            No nausea, no vomiting, no diarrhea, no constipation   ENDO:     No polyuria, no polydipsia, no heat intolerance, no cold intolerance     Past Medical History:  Problem List:  2022-04: Dyspepsia  2022-04: Neuropathy  2022-04: Cervical lymphadenopathy  2021-10: Asthma  2021-10: IBS (irritable bowel syndrome)  2021-10: GERD (gastroesophageal reflux disease)  2021-10: Allergies  2021-10: Chronic allergic rhinitis  2021-10: Sleep apnea  2021-10: Adenoma of left adrenal gland  2021-06: Fatigue  2021-06: Obstructive sleep apnea  2021-05: Anxiety  2021-04: Migraine  2021-04: Chronic diarrhea  2021-04: Hypertrophy of tonsils  2021-04: Seizures (HCC)  2021-04: Snoring      Past Surgical History:  History reviewed. No pertinent surgical history.     Allergies:  Patient has no allergy information on record.  "    Social History:  Social History     Tobacco Use   • Smoking status: Never Smoker   • Smokeless tobacco: Never Used   Vaping Use   • Vaping Use: Never used   Substance Use Topics   • Alcohol use: Never   • Drug use: Never        Family History:   family history is not on file.      PHYSICAL EXAM:   Vital signs: /72 (BP Location: Left arm, Patient Position: Sitting, BP Cuff Size: Large adult)   Pulse 100   Ht 1.676 m (5' 6\")   Wt 117 kg (257 lb)   SpO2 93%   BMI 41.48 kg/m²   GENERAL: Well-developed, well-nourished in no apparent distress.   EYE:  No ocular asymmetry, PERRLA  HENT: Pink, moist mucous membranes.    NECK: No thyromegaly.   CARDIOVASCULAR:  No murmurs  LUNGS: Clear breath sounds  ABDOMEN: Soft, nontender   EXTREMITIES: No clubbing, cyanosis, or edema.   NEUROLOGICAL: No gross focal motor abnormalities   LYMPH: No cervical adenopathy palpated.   SKIN: No rashes, lesions.    ASSESSMENT/PLAN:     1. Incidental Adrenal Cortical Adenoma  Stable.   Incidentaloma found in August 2020.   Dexamethasone Stim Test resulted WNL.   No evidence of hypercortisolism in the past, despite patient's reported symptoms.       2. Vitamin D deficiency  Unstable.   Recommend vitamin D 4000 to 5000 IU daily.      3. Abnormal Thyroid Function  Unstable.   FT4 on low side of normal. Patient is symptomatic.   Recommend starting low dose levothyroxine 25mcg daily.   Detailed instructions given on timing of thyroid hormone.       Thank you kindly for allowing me to participate in the endocrine care plan for this patient.    Mayra Ferguson, APRN  05/11/2022      CC:   Tori Manzano M.D. (Inactive)  "

## 2022-05-13 NOTE — PROGRESS NOTES
New patient note    Physiatry (physical medicine and  Rehabilitation), interventional spine and sports medicine    Date of Service: 5/13/2022    Chief complaint:   Chief Complaint   Patient presents with   • New Patient     Neuropathy arms and legs       HISTORY    HPI: Priyanka Carmona 36 y.o. female who presents today for evaluation of symptoms in her arms and legs.  She reports that she has pain in her neck and low back with pain and twitching in her arms and legs. Pain is 4/10 on the NRS and is ranging from 3-7/10 on the NRS.  She feels like she is vibrating inside.  Numbness and tingling in her legs and arms.  Reports that she has balance issues.    She takes gabapentin and gets some relief.    This started a few years ago.  She has seen a provider in [place], [place] for stomach problems.  She was found to have esophagitis.  She has IBS.  She has also seen a Neurologist.    From what she reports, she has been found to have sleep apnea.      A Neurologist did an EMG.  This was at [facility] with Dr. Oconnor.    Medical records review:  I reviewed the note from the referring provider Tori Manzano M.D. dated April 28, 2022.  She was seen for dyspepsia, neuropathy, cervical lymphadenopathy, GERD, left adrenal gland adenoma, anxiety    Previous treatments:    Physical Therapy: No    Medications the patient is tried: gabapentin 300mg usually at bedtime    Previous interventions: none    Previous surgeries to relieve the above pain:  none      ROS:   Reviewed PMH  Red Flags ROS:   Fever, Chills, Sweats: Denies  Involuntary Weight Loss: Denies  Bladder Incontinence: Denies  Bowel Incontinence: Denies  Saddle Anesthesia: Denies    All other systems reviewed and negative.       PMHx:   History reviewed. No pertinent past medical history.    PSHx:   History reviewed. No pertinent surgical history.    Family history   History reviewed. No pertinent family history.      Medications:   Current Outpatient  "Medications   Medication   • DULoxetine (CYMBALTA) 30 MG Cap DR Particles   • SUMAtriptan (IMITREX) 50 MG Tab   • albuterol 108 (90 Base) MCG/ACT Aero Soln inhalation aerosol   • fluticasone (FLONASE) 50 MCG/ACT nasal spray   • metFORMIN (GLUCOPHAGE) 500 MG Tab   • levothyroxine (SYNTHROID) 25 MCG Tab   • gabapentin (NEURONTIN) 300 MG Cap   • montelukast (SINGULAIR) 10 MG Tab   • esomeprazole (NEXIUM) 20 MG capsule   • zonisamide (ZONEGRAN) 100 MG Cap     No current facility-administered medications for this visit.       Allergies:   Allergies   Allergen Reactions   • Nickel      rash       Social Hx:   Social History     Socioeconomic History   • Marital status:      Spouse name: Not on file   • Number of children: Not on file   • Years of education: Not on file   • Highest education level: Not on file   Occupational History   • Not on file   Tobacco Use   • Smoking status: Never Smoker   • Smokeless tobacco: Never Used   Vaping Use   • Vaping Use: Never used   Substance and Sexual Activity   • Alcohol use: Never   • Drug use: Never   • Sexual activity: Not on file   Other Topics Concern   • Not on file   Social History Narrative   • Not on file     Social Determinants of Health     Financial Resource Strain: Not on file   Food Insecurity: Not on file   Transportation Needs: Not on file   Physical Activity: Not on file   Stress: Not on file   Social Connections: Not on file   Intimate Partner Violence: Not on file   Housing Stability: Not on file         EXAMINATION     Physical Exam:   Vitals: /80 (BP Location: Right arm, Patient Position: Sitting, BP Cuff Size: Adult long)   Pulse 86   Temp 36.6 °C (97.9 °F) (Temporal)   Ht 1.676 m (5' 6\")   Wt 117 kg (257 lb 11.5 oz)   SpO2 97%     Constitutional:   Body Habitus: Body mass index is 41.6 kg/m².  Cooperation: Fully cooperates with exam  Appearance: Well-groomed, well-nourished, not disheveled, in no acute distress    Eyes: No scleral icterus, no " proptosis     ENT -no obvious auditory deficits, wearing a face mask    Skin -no rashes or lesions noted     Respiratory-  breathing comfortable on room air, no audible wheezing    Cardiovascular- capillary refills less than 2 seconds. No lower extremity edema is noted.     Psychiatric- alert and oriented ×3. Normal affect.     Gait - normal gait, no use of ambulatory device, nonantalgic. The patient can toe walk with ease. The patient can heel walk with ease. Balance is appropriate.    Musculoskeletal -   Cervical spine   Inspection: No deformities of the skin over the cervical spine. No rashes or lesions.    full  A/P ROM in all directions, with  pain      Spurling’s sign: negative bilaterally    No signs of muscular atrophy in bilateral upper extremities     No tenderness to palpation of the cervical facets    Thoracic/Lumbar Spine/Sacral Spine/Hips   Inspection: No evidence of atrophy in bilateral lower extremities throughout     ROM: full  AROM with flexion, extension, lateral flexion bilaterally, with pain     Palpation:   No tenderness to palpation in midline at T1-T12 levels. No tenderness to palpation in the left and right of the midline T1-L5    palpation in hip or over the greater trochanters: negative bilaterally      Lumbar spine Special tests  Neuro tension  Straight leg test negative bilaterally       HIP    Range of motion in the hips is within normal limits in internal and external rotation bilaterally    SI joint tests  Observation patient sits on one buttocks: Negative  FRANCISCO test negative bilaterally       Neuro       Key points for the international standards for neurological classification of spinal cord injury (ISNCSCI) to light touch.     Dermatome R L   C4 2 2   C5 2 2   C6 2 2   C7 2 2   C8 2 2   T1 2 2   T2 2 2   L2 2 2   L3 2 2   L4 2 2   L5 2 2   S1 2 2   S2 2 2         Motor Exam Upper Extremities   ? Myotome R L   Shoulder flexion C5 5 5   Elbow flexion C5 5 5   Wrist extension C6 5 5    Elbow extension C7 5 5   Finger flexion C8 5 5   Finger abduction T1 5 5         Motor Exam Lower Extremities    ? Myotome R L   Hip flexion L2 5 5   Knee extension L3 5 5   Ankle dorsiflexion L4 5 5   Toe extension L5 5 5   Ankle plantarflexion S1 5 5       Munson’s sign negative bilaterally   Babinski sign negative bilaterally   Clonus of the ankle negative bilaterally     Reflexes  ?  R L   Biceps  2+ 2+   Brachioradialis  2+ 2+   Patella  2+ 2+   Achilles   2+ 2+       MEDICAL DECISION MAKING    Medical records review: see under HPI section.     DATA    Labs:   Lab Results   Component Value Date/Time    SODIUM 132 (L) 08/21/2021 11:16 AM    POTASSIUM 3.9 08/21/2021 11:16 AM    CHLORIDE 98 08/21/2021 11:16 AM    CO2 23 08/21/2021 11:16 AM    ANION 11.0 08/21/2021 11:16 AM    GLUCOSE 84 08/21/2021 11:16 AM    BUN 14 08/21/2021 11:16 AM    CREATININE 0.66 08/21/2021 11:16 AM    CALCIUM 9.1 08/21/2021 11:16 AM    ASTSGOT 11 (L) 08/21/2021 11:16 AM    ALTSGPT 13 08/21/2021 11:16 AM    TBILIRUBIN 0.2 08/21/2021 11:16 AM    ALBUMIN 4.2 08/21/2021 11:16 AM    TOTPROTEIN 6.9 08/21/2021 11:16 AM    GLOBULIN 2.7 08/21/2021 11:16 AM    AGRATIO 1.6 08/21/2021 11:16 AM       No results found for: PROTHROMBTM, INR     Lab Results   Component Value Date/Time    WBC 10.0 05/13/2022 10:40 AM    RBC 4.34 05/13/2022 10:40 AM    HEMOGLOBIN 13.7 05/13/2022 10:40 AM    HEMATOCRIT 40.8 05/13/2022 10:40 AM    MCV 94.0 05/13/2022 10:40 AM    MCH 31.6 05/13/2022 10:40 AM    MCHC 33.6 05/13/2022 10:40 AM    MPV 9.8 05/13/2022 10:40 AM    NEUTSPOLYS 65.90 05/13/2022 10:40 AM    LYMPHOCYTES 23.60 05/13/2022 10:40 AM    MONOCYTES 6.80 05/13/2022 10:40 AM    EOSINOPHILS 2.30 05/13/2022 10:40 AM    BASOPHILS 0.40 05/13/2022 10:40 AM        Lab Results   Component Value Date/Time    HBA1C 5.1 08/20/2021 08:51 AM        Imaging: I personally reviewed following images, these are my reads  MRI cervical spine March 4, 2022  At C2-3, no central or  foraminal stenosis  At C3-4, no central or foraminal stenosis  At C4-5, mild disc space degeneration and facet arthropathy, no central or foraminal canal stenosis  At C5-6, eccentric right disc osteophyte complex with contact of the spinal cord At C6-7, small disc protrusion without central or foraminal   At C7-T1, no central or foraminal stenosis      IMAGING radiology reads. I reviewed the following radiology reads                  Results for orders placed during the hospital encounter of 03/04/22    MR-CERVICAL SPINE-W/O    Impression  Mild cervical spine degenerative changes, most prominent at the C5-6 level with slight deformity of the right cord.    There is no significant neuroforaminal stenosis in the cervical spine.                                                                          Diagnosis   Visit Diagnoses     ICD-10-CM   1. Musculoskeletal pain  M79.18   2. Myofascial pain  M79.18   3. Cervicalgia  M54.2   4. Paresthesias  R20.2   5. Degeneration of cervical intervertebral disc  M50.30       ASSESSMENT:  Priyanka Kelleebreonna Carmona 36 y.o. female seen for above     Priyanka was seen today for new patient.    Diagnoses and all orders for this visit:    Musculoskeletal pain    Myofascial pain    Cervicalgia    Paresthesias    Degeneration of cervical intervertebral disc      1.  Discussed that she has diffuse pain complaints of unclear etiology.  She reports that she has plans to follow-up with endocrinology regarding the adrenal gland tumor.  2.  I requested records from her work-up and evaluation with Dr. Ocnonor  3.  She does have some minor changes in the cervical spine although this would not account for all of the symptoms that she has.  4.  We did discuss that I would mention to Dr. Manzano that they could consider increasing her duloxetine to 60 mg daily this may decrease her pain complaints.If her other medical work-up is negative for etiology then would consider a diagnosis of fibromyalgia.  We  discussed that I am not prepared to label her symptoms as this yet.    Outside records requested:  The patient signed outside records request form for her outside records including outside images.      Follow-up: Return if symptoms worsen or fail to improve.      Thank you very much for asking me to participate in Priyanka Carmona's care.  Please contact me with any questions or concerns.    Please note that this dictation was created using voice recognition software. I have made every reasonable attempt to correct obvious errors but there may be errors of grammar and content that I may have overlooked prior to finalization of this note.      Alexis Vegas MD  Physical Medicine and Rehabilitation  Interventional Spine and Sports Physiatry  [facility]      CC Tori Manzano M.D.

## 2022-07-09 DIAGNOSIS — G62.9 NEUROPATHY: ICD-10-CM

## 2022-07-11 RX ORDER — DULOXETIN HYDROCHLORIDE 30 MG/1
CAPSULE, DELAYED RELEASE ORAL
Qty: 30 CAPSULE | Refills: 0 | Status: SHIPPED | OUTPATIENT
Start: 2022-07-11 | End: 2022-09-16 | Stop reason: SDUPTHER

## 2022-08-18 ENCOUNTER — APPOINTMENT (OUTPATIENT)
Dept: MEDICAL GROUP | Facility: CLINIC | Age: 37
End: 2022-08-18
Payer: COMMERCIAL

## 2022-09-06 ENCOUNTER — APPOINTMENT (OUTPATIENT)
Dept: ENDOCRINOLOGY | Facility: MEDICAL CENTER | Age: 37
End: 2022-09-06
Payer: COMMERCIAL

## 2022-09-16 ENCOUNTER — HOSPITAL ENCOUNTER (OUTPATIENT)
Facility: MEDICAL CENTER | Age: 37
End: 2022-09-16
Attending: STUDENT IN AN ORGANIZED HEALTH CARE EDUCATION/TRAINING PROGRAM
Payer: COMMERCIAL

## 2022-09-16 ENCOUNTER — OFFICE VISIT (OUTPATIENT)
Dept: MEDICAL GROUP | Facility: CLINIC | Age: 37
End: 2022-09-16
Payer: COMMERCIAL

## 2022-09-16 VITALS
HEIGHT: 66 IN | BODY MASS INDEX: 41.95 KG/M2 | HEART RATE: 69 BPM | OXYGEN SATURATION: 94 % | DIASTOLIC BLOOD PRESSURE: 68 MMHG | WEIGHT: 261 LBS | SYSTOLIC BLOOD PRESSURE: 112 MMHG

## 2022-09-16 DIAGNOSIS — F43.20 ADJUSTMENT DISORDER, UNSPECIFIED TYPE: ICD-10-CM

## 2022-09-16 DIAGNOSIS — G62.9 NEUROPATHY: ICD-10-CM

## 2022-09-16 DIAGNOSIS — Z12.4 SCREENING FOR CERVICAL CANCER: ICD-10-CM

## 2022-09-16 DIAGNOSIS — J45.20 MILD INTERMITTENT ASTHMA WITHOUT COMPLICATION: ICD-10-CM

## 2022-09-16 PROCEDURE — 99214 OFFICE O/P EST MOD 30 MIN: CPT | Performed by: STUDENT IN AN ORGANIZED HEALTH CARE EDUCATION/TRAINING PROGRAM

## 2022-09-16 PROCEDURE — 87624 HPV HI-RISK TYP POOLED RSLT: CPT

## 2022-09-16 PROCEDURE — 88175 CYTOPATH C/V AUTO FLUID REDO: CPT

## 2022-09-16 RX ORDER — MEDROXYPROGESTERONE ACETATE 10 MG/1
TABLET ORAL
COMMUNITY
Start: 2022-06-24

## 2022-09-16 RX ORDER — ALBUTEROL SULFATE 90 UG/1
1-2 AEROSOL, METERED RESPIRATORY (INHALATION) EVERY 4 HOURS PRN
Qty: 8.5 G | Refills: 2 | Status: SHIPPED | OUTPATIENT
Start: 2022-09-16

## 2022-09-16 RX ORDER — NAPROXEN SODIUM 550 MG/1
TABLET ORAL
COMMUNITY
Start: 2022-06-27

## 2022-09-16 RX ORDER — DULOXETIN HYDROCHLORIDE 30 MG/1
30 CAPSULE, DELAYED RELEASE ORAL DAILY
Qty: 90 CAPSULE | Refills: 3 | Status: SHIPPED | OUTPATIENT
Start: 2022-09-16

## 2022-09-16 RX ORDER — MONTELUKAST SODIUM 10 MG/1
10 TABLET ORAL DAILY
Qty: 90 TABLET | Refills: 3 | Status: SHIPPED | OUTPATIENT
Start: 2022-09-16

## 2022-09-16 SDOH — ECONOMIC STABILITY: HOUSING INSECURITY
IN THE LAST 12 MONTHS, WAS THERE A TIME WHEN YOU DID NOT HAVE A STEADY PLACE TO SLEEP OR SLEPT IN A SHELTER (INCLUDING NOW)?: NO

## 2022-09-16 SDOH — ECONOMIC STABILITY: HOUSING INSECURITY

## 2022-09-16 SDOH — ECONOMIC STABILITY: FOOD INSECURITY: WITHIN THE PAST 12 MONTHS, THE FOOD YOU BOUGHT JUST DIDN'T LAST AND YOU DIDN'T HAVE MONEY TO GET MORE.: NEVER TRUE

## 2022-09-16 SDOH — HEALTH STABILITY: PHYSICAL HEALTH: ON AVERAGE, HOW MANY DAYS PER WEEK DO YOU ENGAGE IN MODERATE TO STRENUOUS EXERCISE (LIKE A BRISK WALK)?: 2 DAYS

## 2022-09-16 SDOH — ECONOMIC STABILITY: INCOME INSECURITY: HOW HARD IS IT FOR YOU TO PAY FOR THE VERY BASICS LIKE FOOD, HOUSING, MEDICAL CARE, AND HEATING?: NOT VERY HARD

## 2022-09-16 SDOH — ECONOMIC STABILITY: TRANSPORTATION INSECURITY
IN THE PAST 12 MONTHS, HAS THE LACK OF TRANSPORTATION KEPT YOU FROM MEDICAL APPOINTMENTS OR FROM GETTING MEDICATIONS?: NO

## 2022-09-16 SDOH — ECONOMIC STABILITY: INCOME INSECURITY: IN THE LAST 12 MONTHS, WAS THERE A TIME WHEN YOU WERE NOT ABLE TO PAY THE MORTGAGE OR RENT ON TIME?: NO

## 2022-09-16 SDOH — HEALTH STABILITY: PHYSICAL HEALTH: ON AVERAGE, HOW MANY MINUTES DO YOU ENGAGE IN EXERCISE AT THIS LEVEL?: 20 MIN

## 2022-09-16 SDOH — ECONOMIC STABILITY: TRANSPORTATION INSECURITY
IN THE PAST 12 MONTHS, HAS LACK OF TRANSPORTATION KEPT YOU FROM MEETINGS, WORK, OR FROM GETTING THINGS NEEDED FOR DAILY LIVING?: NO

## 2022-09-16 SDOH — ECONOMIC STABILITY: TRANSPORTATION INSECURITY
IN THE PAST 12 MONTHS, HAS LACK OF RELIABLE TRANSPORTATION KEPT YOU FROM MEDICAL APPOINTMENTS, MEETINGS, WORK OR FROM GETTING THINGS NEEDED FOR DAILY LIVING?: NO

## 2022-09-16 SDOH — HEALTH STABILITY: MENTAL HEALTH
STRESS IS WHEN SOMEONE FEELS TENSE, NERVOUS, ANXIOUS, OR CAN'T SLEEP AT NIGHT BECAUSE THEIR MIND IS TROUBLED. HOW STRESSED ARE YOU?: RATHER MUCH

## 2022-09-16 ASSESSMENT — SOCIAL DETERMINANTS OF HEALTH (SDOH)
DO YOU BELONG TO ANY CLUBS OR ORGANIZATIONS SUCH AS CHURCH GROUPS UNIONS, FRATERNAL OR ATHLETIC GROUPS, OR SCHOOL GROUPS?: NO
IN A TYPICAL WEEK, HOW MANY TIMES DO YOU TALK ON THE PHONE WITH FAMILY, FRIENDS, OR NEIGHBORS?: THREE TIMES A WEEK
IN A TYPICAL WEEK, HOW MANY TIMES DO YOU TALK ON THE PHONE WITH FAMILY, FRIENDS, OR NEIGHBORS?: THREE TIMES A WEEK
DO YOU BELONG TO ANY CLUBS OR ORGANIZATIONS SUCH AS CHURCH GROUPS UNIONS, FRATERNAL OR ATHLETIC GROUPS, OR SCHOOL GROUPS?: NO
HOW OFTEN DO YOU HAVE SIX OR MORE DRINKS ON ONE OCCASION: NEVER
HOW OFTEN DO YOU ATTENT MEETINGS OF THE CLUB OR ORGANIZATION YOU BELONG TO?: NEVER
HOW OFTEN DO YOU ATTENT MEETINGS OF THE CLUB OR ORGANIZATION YOU BELONG TO?: NEVER
HOW HARD IS IT FOR YOU TO PAY FOR THE VERY BASICS LIKE FOOD, HOUSING, MEDICAL CARE, AND HEATING?: NOT VERY HARD
HOW OFTEN DO YOU GET TOGETHER WITH FRIENDS OR RELATIVES?: ONCE A WEEK
HOW OFTEN DO YOU ATTEND CHURCH OR RELIGIOUS SERVICES?: NEVER
HOW OFTEN DO YOU HAVE A DRINK CONTAINING ALCOHOL: MONTHLY OR LESS
HOW MANY DRINKS CONTAINING ALCOHOL DO YOU HAVE ON A TYPICAL DAY WHEN YOU ARE DRINKING: 1 OR 2
HOW OFTEN DO YOU GET TOGETHER WITH FRIENDS OR RELATIVES?: ONCE A WEEK
HOW OFTEN DO YOU ATTEND CHURCH OR RELIGIOUS SERVICES?: NEVER

## 2022-09-16 ASSESSMENT — LIFESTYLE VARIABLES
HOW OFTEN DO YOU HAVE A DRINK CONTAINING ALCOHOL: MONTHLY OR LESS
HOW OFTEN DO YOU HAVE SIX OR MORE DRINKS ON ONE OCCASION: NEVER
HOW MANY STANDARD DRINKS CONTAINING ALCOHOL DO YOU HAVE ON A TYPICAL DAY: 1 OR 2
SKIP TO QUESTIONS 9-10: 1
AUDIT-C TOTAL SCORE: 1

## 2022-09-16 ASSESSMENT — FIBROSIS 4 INDEX: FIB4 SCORE: 0.41

## 2022-09-16 NOTE — LETTER
September 16, 2022        Priyanka Carmona        To whom it may concern:    The above named patient is under my care and was seen in my office on 9/16/2022. I believe she would benefit from the ability to take intermittent FMLA as needed due to her current circumstances.    Please do not hesitate to contact my office if you need additional information.               Sincerely,      Tori Manzano M.D.

## 2022-09-17 LAB — AMBIGUOUS DTTM AMBI4: NORMAL

## 2022-09-19 LAB
CYTOLOGY REG CYTOL: NORMAL
HPV HR 12 DNA CVX QL NAA+PROBE: NEGATIVE
HPV16 DNA SPEC QL NAA+PROBE: NEGATIVE
HPV18 DNA SPEC QL NAA+PROBE: NEGATIVE
SPECIMEN SOURCE: NORMAL

## 2022-09-20 PROBLEM — Z12.4 SCREENING FOR CERVICAL CANCER: Status: ACTIVE | Noted: 2022-09-20

## 2022-09-20 PROBLEM — F43.20 ADJUSTMENT DISORDER: Status: ACTIVE | Noted: 2022-09-20

## 2022-09-20 ASSESSMENT — ENCOUNTER SYMPTOMS
FOCAL WEAKNESS: 0
SPEECH CHANGE: 0
TINGLING: 1
SHORTNESS OF BREATH: 0
COUGH: 0
FEVER: 0
CHILLS: 0
SENSORY CHANGE: 1
EYE REDNESS: 0

## 2022-09-20 NOTE — ASSESSMENT & PLAN NOTE
- Referral to behavioral health for therapy  - Encouraged continued social interactions and peer support  - Provided letter of support for intermittent leave to allow for appointments or days off for mental health as needed
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
## 2022-09-20 NOTE — ASSESSMENT & PLAN NOTE
Continue duloxetine, refilled today  Patient working with endocrinology on evaluation of full constellation of sx of uncertain etiology

## 2022-09-21 RX ORDER — FLUCONAZOLE 150 MG/1
150 TABLET ORAL ONCE
Qty: 1 TABLET | Refills: 0 | Status: SHIPPED | OUTPATIENT
Start: 2022-09-21 | End: 2022-09-21

## 2022-09-22 NOTE — PROGRESS NOTES
Fungal organisms c/w candida noted on pap smear. Patient with vulvar irritation/rash.  Will treat with 150 mg fluconazole x1.

## 2022-10-06 ENCOUNTER — TELEPHONE (OUTPATIENT)
Dept: MEDICAL GROUP | Facility: CLINIC | Age: 37
End: 2022-10-06
Payer: COMMERCIAL

## 2022-10-06 NOTE — TELEPHONE ENCOUNTER
VOICEMAIL  1. Caller Name: Priyanka Carmona    Call Back Number: [phone] (home)       2. Message: Pt left message regarding FMLA paperwork, She was told to call by Monday and has not heard anything yet. Please advise.     3. Patient approves office to leave a detailed voicemail/MyChart message: yes

## 2024-04-24 ENCOUNTER — APPOINTMENT (RX ONLY)
Dept: URBAN - METROPOLITAN AREA CLINIC 15 | Facility: CLINIC | Age: 39
Setting detail: DERMATOLOGY
End: 2024-04-24

## 2024-04-24 DIAGNOSIS — B07.8 OTHER VIRAL WARTS: ICD-10-CM

## 2024-04-24 DIAGNOSIS — L71.8 OTHER ROSACEA: ICD-10-CM | Status: INADEQUATELY CONTROLLED

## 2024-04-24 PROCEDURE — ? DIAGNOSIS COMMENT

## 2024-04-24 PROCEDURE — ? PRESCRIPTION

## 2024-04-24 PROCEDURE — ? LIQUID NITROGEN

## 2024-04-24 PROCEDURE — 99214 OFFICE O/P EST MOD 30 MIN: CPT | Mod: 25

## 2024-04-24 PROCEDURE — ? COUNSELING

## 2024-04-24 PROCEDURE — 17110 DESTRUCTION B9 LES UP TO 14: CPT

## 2024-04-24 RX ORDER — METRONIDAZOLE 7.5 MG/G
CREAM TOPICAL
Qty: 45 | Refills: 11 | Status: ERX | COMMUNITY
Start: 2024-04-24

## 2024-04-24 RX ADMIN — METRONIDAZOLE: 7.5 CREAM TOPICAL at 00:00

## 2024-04-24 ASSESSMENT — LOCATION DETAILED DESCRIPTION DERM
LOCATION DETAILED: LEFT CENTRAL MALAR CHEEK
LOCATION DETAILED: 4TH WEB SPACE RIGHT HAND
LOCATION DETAILED: RIGHT CENTRAL MALAR CHEEK

## 2024-04-24 ASSESSMENT — LOCATION ZONE DERM
LOCATION ZONE: FACE
LOCATION ZONE: HAND

## 2024-04-24 ASSESSMENT — LOCATION SIMPLE DESCRIPTION DERM
LOCATION SIMPLE: LEFT CHEEK
LOCATION SIMPLE: RIGHT CHEEK
LOCATION SIMPLE: RIGHT HAND

## 2024-04-24 ASSESSMENT — SEVERITY ASSESSMENT OVERALL AMONG ALL PATIENTS: IN YOUR EXPERIENCE, AMONG ALL PATIENTS YOU HAVE SEEN WITH THIS CONDITION, HOW SEVERE IS THIS PATIENT'S CONDITION?: MILD

## 2024-04-24 NOTE — PROCEDURE: DIAGNOSIS COMMENT
Comment: Pt presents today for 2nd treatment of wart on L hand.\\nGreatly improved after first treatment (curette/LN2) but not yet fully resolved.
Detail Level: Zone
Render Risk Assessment In Note?: no

## 2024-08-02 ENCOUNTER — HOSPITAL ENCOUNTER (OUTPATIENT)
Dept: LAB | Facility: MEDICAL CENTER | Age: 39
End: 2024-08-02
Payer: COMMERCIAL

## 2024-08-02 ENCOUNTER — APPOINTMENT (OUTPATIENT)
Dept: MEDICAL GROUP | Facility: CLINIC | Age: 39
End: 2024-08-02
Payer: COMMERCIAL

## 2024-08-02 VITALS
SYSTOLIC BLOOD PRESSURE: 114 MMHG | HEIGHT: 65 IN | OXYGEN SATURATION: 96 % | DIASTOLIC BLOOD PRESSURE: 67 MMHG | TEMPERATURE: 97.1 F | WEIGHT: 267 LBS | HEART RATE: 74 BPM | BODY MASS INDEX: 44.48 KG/M2

## 2024-08-02 DIAGNOSIS — K58.0 IRRITABLE BOWEL SYNDROME WITH DIARRHEA: ICD-10-CM

## 2024-08-02 DIAGNOSIS — N89.8 VAGINAL ITCHING: ICD-10-CM

## 2024-08-02 DIAGNOSIS — R30.0 DYSURIA: ICD-10-CM

## 2024-08-02 DIAGNOSIS — F41.9 ANXIETY: ICD-10-CM

## 2024-08-02 DIAGNOSIS — Z13.31 POSITIVE DEPRESSION SCREENING: ICD-10-CM

## 2024-08-02 LAB
APPEARANCE UR: CLEAR
BILIRUB UR STRIP-MCNC: NORMAL MG/DL
COLOR UR AUTO: YELLOW
GLUCOSE UR STRIP.AUTO-MCNC: NORMAL MG/DL
HCV AB SER QL: NORMAL
HIV 1+2 AB+HIV1 P24 AG SERPL QL IA: NORMAL
KETONES UR STRIP.AUTO-MCNC: NORMAL MG/DL
LEUKOCYTE ESTERASE UR QL STRIP.AUTO: NORMAL
NITRITE UR QL STRIP.AUTO: NORMAL
PH UR STRIP.AUTO: 6 [PH] (ref 5–8)
PROT UR QL STRIP: NORMAL MG/DL
RBC UR QL AUTO: NORMAL
SP GR UR STRIP.AUTO: 1.03
T PALLIDUM AB SER QL IA: NORMAL
UROBILINOGEN UR STRIP-MCNC: 0.2 MG/DL

## 2024-08-02 PROCEDURE — 81002 URINALYSIS NONAUTO W/O SCOPE: CPT | Mod: GC

## 2024-08-02 PROCEDURE — 86803 HEPATITIS C AB TEST: CPT

## 2024-08-02 PROCEDURE — 99214 OFFICE O/P EST MOD 30 MIN: CPT | Mod: GC

## 2024-08-02 PROCEDURE — 86780 TREPONEMA PALLIDUM: CPT

## 2024-08-02 PROCEDURE — 87086 URINE CULTURE/COLONY COUNT: CPT

## 2024-08-02 PROCEDURE — 87389 HIV-1 AG W/HIV-1&-2 AB AG IA: CPT

## 2024-08-02 PROCEDURE — 36415 COLL VENOUS BLD VENIPUNCTURE: CPT

## 2024-08-02 RX ORDER — DULOXETIN HYDROCHLORIDE 30 MG/1
30 CAPSULE, DELAYED RELEASE ORAL DAILY
Qty: 90 CAPSULE | Refills: 3 | Status: SHIPPED | OUTPATIENT
Start: 2024-08-02

## 2024-08-02 RX ORDER — FLUCONAZOLE 150 MG/1
150 TABLET ORAL DAILY
Qty: 1 TABLET | Refills: 0 | Status: SHIPPED | OUTPATIENT
Start: 2024-08-02

## 2024-08-02 ASSESSMENT — PATIENT HEALTH QUESTIONNAIRE - PHQ9
5. POOR APPETITE OR OVEREATING: 1 - SEVERAL DAYS
CLINICAL INTERPRETATION OF PHQ2 SCORE: 1
SUM OF ALL RESPONSES TO PHQ QUESTIONS 1-9: 9

## 2024-08-02 NOTE — PROGRESS NOTES
SUBJECTIVE:     CC:   Chief Complaint   Patient presents with    Follow-Up     Follow up IBD  Discuss weight concerns       HPI:   Priyanka presents today c/o painful urination and vaginal itching, which she states started this morning. She is also concerned about her chronic diarrhea. She states for few years now, she defecates about five times overnight, but improved during the day. Patient states workup for IBD in the past has been negative. She states she take daily probiotics, denies use of any other medications, alcohol or substance use.       Patient Active Problem List  Patient Active Problem List   Diagnosis    Migraine    Asthma    IBS (irritable bowel syndrome)    GERD (gastroesophageal reflux disease)    Chronic allergic rhinitis    Sleep apnea    Adenoma of left adrenal gland    Anxiety    Chronic diarrhea    Fatigue    Hypertrophy of tonsils    Seizures (HCC)    Snoring    Obstructive sleep apnea    Dyspepsia    Neuropathy    Cervical lymphadenopathy    Adjustment disorder    Screening for cervical cancer     Surgical History:  No past surgical history on file.    Family History:  No family history on file.    Social History:  Social History     Tobacco Use    Smoking status: Never    Smokeless tobacco: Never   Vaping Use    Vaping status: Never Used   Substance Use Topics    Alcohol use: Never    Drug use: Never       Medications:  Current Outpatient Medications on File Prior to Visit   Medication Sig Dispense Refill    medroxyPROGESTERone (PROVERA) 10 MG Tab TAKE 1 TABLET BY MOUTH ONCE DAILY WITH FOOD FOR THE FIRST 10 DAYS OF EACH MONTH (Patient not taking: Reported on 8/2/2024)      naproxen sodium (ANAPROX) 550 MG tablet TAKE 1 TABLET BY MOUTH EVERY 12 HOURS WITH FOOD OR MILK AS NEEDED DURING MENSES (Patient not taking: Reported on 8/2/2024)      albuterol 108 (90 Base) MCG/ACT Aero Soln inhalation aerosol Inhale 1-2 Puffs every four hours as needed for Shortness of Breath (or wheezing).  "(Patient not taking: Reported on 8/2/2024) 8.5 g 2    montelukast (SINGULAIR) 10 MG Tab Take 1 Tablet by mouth every day. (Patient not taking: Reported on 8/2/2024) 90 Tablet 3    metFORMIN (GLUCOPHAGE) 500 MG Tab Take 1 Tablet by mouth 3 times a day. (Patient not taking: Reported on 8/2/2024) 270 Tablet 3    levothyroxine (SYNTHROID) 25 MCG Tab Take 1 Tablet by mouth every morning on an empty stomach. (Patient not taking: Reported on 8/2/2024) 90 Tablet 3    SUMAtriptan (IMITREX) 50 MG Tab TAKE ONE TABLET BY MOUTH AT ONSET OF MIGRAINE. IF SYMPTOMS PERSIST, A SECOND DOSE MAY BE TAKEN IN 2 HOURS. DO NOT EXCEED 2 DOSES IN A 24 HOUR PERIOD, UNLESS OTHERWISE INSTRUCTED BY YOUR PHYSICIAN (Patient not taking: Reported on 8/2/2024)      esomeprazole (NEXIUM) 20 MG capsule Take 20 mg by mouth. (Patient not taking: Reported on 8/2/2024)      fluticasone (FLONASE) 50 MCG/ACT nasal spray 2 Sprays. (Patient not taking: Reported on 8/2/2024)      zonisamide (ZONEGRAN) 100 MG Cap Take 100 mg by mouth. (Patient not taking: Reported on 8/2/2024)       No current facility-administered medications on file prior to visit.       Allergies   Allergen Reactions    Nickel      rash         ROS:   Gen: no fevers/chills, no changes in weight  Eyes: no changes in vision  ENT: no changes in hearing  Pulm: no sob, no cough  CV: no chest pain, no palpitations  GI: no nausea/vomiting, + diarrhea  MSk: no myalgias  Skin: no rash  Neuro: no headaches, no numbness/tingling      OBJECTIVE:     Exam:  /67 (BP Location: Left arm, Patient Position: Sitting)   Pulse 74   Temp 36.2 °C (97.1 °F) (Temporal)   Ht 1.659 m (5' 5.3\")   Wt 121 kg (267 lb)   SpO2 96%   BMI 44.02 kg/m²  Body mass index is 44.02 kg/m².    Gen: Alert and oriented, No apparent distress.  Head:  NCAT, EOMI, sclera clear without discharge  Lungs: Normal effort, CTA bilaterally, no wheezes, rhonchi, or rales  CV: Regular rate and rhythm. No murmurs, rubs, or gallops.  Abd:  "  Non-distended, soft  Ext: No clubbing, cyanosis, edema.  MSK: Unassisted gait  Psych: normal mood and affect      Depression Screening  Little interest or pleasure in doing things?  1 - several days   Feeling down, depressed , or hopeless? 0 - not at all   Trouble falling or staying asleep, or sleeping too much?  3 - nearly every day   Feeling tired or having little energy?  3 - nearly every day   Poor appetite or overeating?  1 - several days   Feeling bad about yourself - or that you are a failure or have let yourself or your family down? 0 - not at all   Trouble concentrating on things, such as reading the newspaper or watching television? 1 - several days   Moving or speaking so slowly that other people could have noticed.  Or the opposite - being so fidgety or restless that you have been moving around a lot more than usual?  0 - not at all   Thoughts that you would be better off dead, or of hurting yourself?  0 - not at all   Patient Health Questionnaire Score: 9     If depressive symptoms identified deferred to follow up visit unless specifically addressed in assesment and plan.    Interpretation of PHQ-9 Total Score   Score Severity   1-4 No Depression   5-9 Mild Depression   10-14 Moderate Depression   15-19 Moderately Severe Depression   20-27 Severe Depression    Labs:  Results for orders placed or performed in visit on 08/02/24   POCT Urinalysis   Result Value Ref Range    POC Color yellow Negative    POC Appearance clear Negative    POC Glucose neg Negative mg/dL    POC Bilirubin neg Negative mg/dL    POC Ketones neg Negative mg/dL    POC Specific Gravity 1.030 <1.005 - >1.030    POC Blood neg Negative    POC Urine PH 6.0 5.0 - 8.0    POC Protein neg Negative mg/dL    POC Urobiligen 0.2 Negative (0.2) mg/dL    POC Nitrites neg Negative    POC Leukocyte Esterase neg Negative         ASSESSMENT & PLAN:     38 y.o. female with the following -    Problem List Items Addressed This Visit       IBS (irritable  bowel syndrome) - Diarrhea  Patient reports she has tried several antidiarrheal medications. She has a history of anxiety that was previously managed with Duloxetine.  - Will restart Duloxetine.  - Encourage patient to avoid possible trigger foods.  - Emphasized adequate daily oral hydration.  - Recommend IBgard peppermint oil supplement.  - Antidiarrheals, if having diarrhea.   - Discussed stress management at work.   - Follow up in 4 weeks or as needed.    Relevant Medications    DULoxetine (CYMBALTA) 30 MG Cap DR Particles      Dysuria      Vaginal itching  - POCT UA was unremarkable. Microscopic view of vaginal swab showed no organisms.   - Will order urine culture and STI panel.  - Will order a one time dose of fluconazole and treat for candida vaginitis empirically.    Relevant Medications    fluconazole (DIFLUCAN) 150 MG tablet    Other Relevant Orders    POCT Urinalysis (Completed)    HIV AG/AB Combo Assay Screening    T.Pallidum AB MADDIE (Screening)    Hepatitis C Virus Antibody    Chlamydia/GC, PCR (Genital/Anal swab)    Trichomonas Vaginalis by TMA    URINE CULTURE(NEW)      Anxiety  Positive Depression screen  Patient has a history of anxiety, previously managed on Duloxetine and therapy. Previous referral was cancelled as provider was not within patient's network. PHQ9 today was 9.  - Will restart Duloxetine.  - Follow up in 4 weeks.    Relevant Medications    DULoxetine (CYMBALTA) 30 MG Cap DR Patience Rutherford, PGY-3  [facility]

## 2024-08-04 LAB
BACTERIA UR CULT: NORMAL
SIGNIFICANT IND 70042: NORMAL
SITE SITE: NORMAL
SOURCE SOURCE: NORMAL

## 2024-10-03 ENCOUNTER — APPOINTMENT (OUTPATIENT)
Dept: MEDICAL GROUP | Facility: CLINIC | Age: 39
End: 2024-10-03
Payer: COMMERCIAL

## 2025-01-17 ENCOUNTER — RESEARCH ENCOUNTER (OUTPATIENT)
Dept: RESEARCH | Facility: MEDICAL CENTER | Age: 40
End: 2025-01-17
Payer: COMMERCIAL

## 2025-01-19 ENCOUNTER — OFFICE VISIT (OUTPATIENT)
Dept: URGENT CARE | Facility: PHYSICIAN GROUP | Age: 40
End: 2025-01-19
Payer: COMMERCIAL

## 2025-01-19 VITALS
RESPIRATION RATE: 16 BRPM | DIASTOLIC BLOOD PRESSURE: 80 MMHG | WEIGHT: 250 LBS | TEMPERATURE: 98.4 F | SYSTOLIC BLOOD PRESSURE: 118 MMHG | HEART RATE: 77 BPM | HEIGHT: 65 IN | OXYGEN SATURATION: 95 % | BODY MASS INDEX: 41.65 KG/M2

## 2025-01-19 DIAGNOSIS — T36.95XA ANTIBIOTIC-INDUCED YEAST INFECTION: ICD-10-CM

## 2025-01-19 DIAGNOSIS — J01.90 ACUTE BACTERIAL RHINOSINUSITIS: ICD-10-CM

## 2025-01-19 DIAGNOSIS — L72.0 EPIDERMOID CYST OF SKIN OF CHEST: ICD-10-CM

## 2025-01-19 DIAGNOSIS — B96.89 ACUTE BACTERIAL RHINOSINUSITIS: ICD-10-CM

## 2025-01-19 DIAGNOSIS — B37.9 ANTIBIOTIC-INDUCED YEAST INFECTION: ICD-10-CM

## 2025-01-19 PROCEDURE — 3079F DIAST BP 80-89 MM HG: CPT | Performed by: STUDENT IN AN ORGANIZED HEALTH CARE EDUCATION/TRAINING PROGRAM

## 2025-01-19 PROCEDURE — 3074F SYST BP LT 130 MM HG: CPT | Performed by: STUDENT IN AN ORGANIZED HEALTH CARE EDUCATION/TRAINING PROGRAM

## 2025-01-19 PROCEDURE — 99203 OFFICE O/P NEW LOW 30 MIN: CPT | Performed by: STUDENT IN AN ORGANIZED HEALTH CARE EDUCATION/TRAINING PROGRAM

## 2025-01-19 RX ORDER — SEMAGLUTIDE 0.5 MG/.5ML
INJECTION, SOLUTION SUBCUTANEOUS
COMMUNITY
Start: 2024-12-19

## 2025-01-19 RX ORDER — FLUCONAZOLE 150 MG/1
TABLET ORAL
Qty: 2 TABLET | Refills: 0 | Status: SHIPPED | OUTPATIENT
Start: 2025-01-19

## 2025-01-19 RX ORDER — SEMAGLUTIDE 0.25 MG/.5ML
INJECTION, SOLUTION SUBCUTANEOUS
COMMUNITY
Start: 2024-12-03

## 2025-01-19 RX ORDER — SEMAGLUTIDE 1 MG/.5ML
INJECTION, SOLUTION SUBCUTANEOUS
COMMUNITY
Start: 2025-01-17

## 2025-01-19 RX ORDER — ONDANSETRON 4 MG/1
TABLET, ORALLY DISINTEGRATING ORAL
COMMUNITY

## 2025-01-19 NOTE — PROGRESS NOTES
Subjective:   CHIEF COMPLAINT  Chief Complaint   Patient presents with    Bump     On chest x2 weeks        HPI  Priyanka Carmona is a 39 y.o. female who presents for evaluation of a painful bump superficial to the lower part of the sternum which developed approximately 2 weeks ago.  Reports it has enlarged in size and redness.  Reports local tenderness to palpation.  No history of similar lesions.  She has not tried any topical or oral medication for symptomatic relief.    Patient is also complaining with sinus infection.  Reports has been experiencing persistent sinus congestion, PND and cough for 1 month.  She has a history of asthma and reports increased need of albuterol.  States symptoms are worse at night and keeping her awake.  Positive ROS for mild wheezing.  No recent fevers.    REVIEW OF SYSTEMS  General: no fever or chills  GI: no nausea or vomiting  See HPI for further details.    PAST MEDICAL HISTORY  Patient Active Problem List    Diagnosis Date Noted    Adjustment disorder 09/20/2022    Screening for cervical cancer 09/20/2022    Dyspepsia 04/28/2022    Neuropathy 04/28/2022    Cervical lymphadenopathy 04/28/2022    Asthma 10/15/2021    IBS (irritable bowel syndrome) 10/15/2021    GERD (gastroesophageal reflux disease) 10/15/2021    Chronic allergic rhinitis 10/15/2021    Sleep apnea 10/15/2021    Adenoma of left adrenal gland 10/15/2021    Fatigue 06/25/2021    Obstructive sleep apnea 06/25/2021    Anxiety 05/28/2021    Migraine 04/16/2021    Chronic diarrhea 04/16/2021    Hypertrophy of tonsils 04/16/2021    Seizures (HCC) 04/16/2021    Snoring 04/16/2021       SURGICAL HISTORY  patient denies any surgical history    ALLERGIES  Allergies   Allergen Reactions    Nickel Unspecified     rash       CURRENT MEDICATIONS  albuterol Aers  amoxicillin-clavulanate Tabs  DULoxetine Cpep  esomeprazole  fluconazole  fluticasone  levothyroxine Tabs  medroxyPROGESTERone Tabs  metFORMIN Tabs  montelukast  "Tabs  naproxen sodium  ondansetron Tbdp  SUMAtriptan Tabs  [place]  zonisamide Caps    SOCIAL HISTORY  Social History     Tobacco Use    Smoking status: Never    Smokeless tobacco: Never   Vaping Use    Vaping status: Never Used   Substance and Sexual Activity    Alcohol use: Never    Drug use: Never    Sexual activity: Not on file       FAMILY HISTORY  History reviewed. No pertinent family history.       Objective:   PHYSICAL EXAM  VITAL SIGNS: /80 (BP Location: Left arm, Patient Position: Sitting, BP Cuff Size: Large adult)   Pulse 77   Temp 36.9 °C (98.4 °F) (Temporal)   Resp 16   Ht 1.651 m (5' 5\")   Wt 113 kg (250 lb)   SpO2 95%   BMI 41.60 kg/m²     Gen: no acute distress, normal voice  Skin: dry, intact, moist mucosal membranes  Eyes: No conjunctival injection bilaterally.  Neck: Normal range of motion. No meningeal signs.   Lungs: No increased work of breathing.  CTAB w/ symmetric expansion  CV: RRR w/o murmurs or clicks  Psych: normal affect, normal judgement, alert, awake    Assessment/Plan:     1. Acute bacterial rhinosinusitis  amoxicillin-clavulanate (AUGMENTIN) 875-125 MG Tab      2. Antibiotic-induced yeast infection  fluconazole (DIFLUCAN) 150 MG tablet      3. Epidermoid cyst of skin of chest        1-2) history consistent with a bacterial sinus infection.  Lungs CTAB and no evidence of an asthma exacerbation or indication for p.o. steroids.  -Ordered Augmentin  -Ordered Diflucan      3) does not need to be drained.  Fortunately she is established with dermatology.  Augmentin should cover any secondary infection.  Recommend she follow-up with dermatology for definitive treatment          Please note that this dictation was created using voice recognition software. I have made a reasonable attempt to correct obvious errors, but I expect that there are errors of grammar and possibly content that I did not discover before finalizing the note.         "

## 2025-05-09 ENCOUNTER — OFFICE VISIT (OUTPATIENT)
Dept: URGENT CARE | Facility: PHYSICIAN GROUP | Age: 40
End: 2025-05-09
Payer: COMMERCIAL

## 2025-05-09 ENCOUNTER — HOSPITAL ENCOUNTER (OUTPATIENT)
Facility: MEDICAL CENTER | Age: 40
End: 2025-05-09
Payer: COMMERCIAL

## 2025-05-09 ENCOUNTER — APPOINTMENT (OUTPATIENT)
Dept: RADIOLOGY | Facility: IMAGING CENTER | Age: 40
End: 2025-05-09
Payer: COMMERCIAL

## 2025-05-09 VITALS
RESPIRATION RATE: 18 BRPM | SYSTOLIC BLOOD PRESSURE: 116 MMHG | OXYGEN SATURATION: 95 % | WEIGHT: 248 LBS | DIASTOLIC BLOOD PRESSURE: 70 MMHG | TEMPERATURE: 98.7 F | HEIGHT: 65 IN | HEART RATE: 70 BPM | BODY MASS INDEX: 41.32 KG/M2

## 2025-05-09 DIAGNOSIS — R39.9 UTI SYMPTOMS: ICD-10-CM

## 2025-05-09 DIAGNOSIS — M17.12 OSTEOARTHRITIS OF LEFT PATELLOFEMORAL JOINT: ICD-10-CM

## 2025-05-09 DIAGNOSIS — M25.562 ACUTE PAIN OF LEFT KNEE: ICD-10-CM

## 2025-05-09 DIAGNOSIS — M25.462 EFFUSION OF LEFT KNEE: ICD-10-CM

## 2025-05-09 LAB
APPEARANCE UR: CLEAR
BILIRUB UR STRIP-MCNC: NORMAL MG/DL
CANDIDA DNA VAG QL PROBE+SIG AMP: NEGATIVE
COLOR UR AUTO: YELLOW
G VAGINALIS DNA VAG QL PROBE+SIG AMP: NEGATIVE
GLUCOSE UR STRIP.AUTO-MCNC: NORMAL MG/DL
KETONES UR STRIP.AUTO-MCNC: NORMAL MG/DL
LEUKOCYTE ESTERASE UR QL STRIP.AUTO: NORMAL
NITRITE UR QL STRIP.AUTO: NORMAL
PH UR STRIP.AUTO: 6 [PH] (ref 5–8)
PROT UR QL STRIP: NORMAL MG/DL
RBC UR QL AUTO: NORMAL
SP GR UR STRIP.AUTO: 1.02
T VAGINALIS DNA VAG QL PROBE+SIG AMP: NEGATIVE
UROBILINOGEN UR STRIP-MCNC: 0.2 MG/DL

## 2025-05-09 PROCEDURE — 87660 TRICHOMONAS VAGIN DIR PROBE: CPT

## 2025-05-09 PROCEDURE — 87510 GARDNER VAG DNA DIR PROBE: CPT

## 2025-05-09 PROCEDURE — 87480 CANDIDA DNA DIR PROBE: CPT

## 2025-05-09 PROCEDURE — 99214 OFFICE O/P EST MOD 30 MIN: CPT

## 2025-05-09 PROCEDURE — 73562 X-RAY EXAM OF KNEE 3: CPT | Mod: TC,LT | Performed by: RADIOLOGY

## 2025-05-09 PROCEDURE — 81002 URINALYSIS NONAUTO W/O SCOPE: CPT

## 2025-05-09 ASSESSMENT — ENCOUNTER SYMPTOMS
FEVER: 0
CHILLS: 0

## 2025-05-09 NOTE — PROGRESS NOTES
CHIEF COMPLAINT  Chief Complaint   Patient presents with    Knee Pain     Left pain on knee x 4 days   possible for UTI feeling itchy  x 1 week     Subjective:   Priyanka Carmona is a 39 y.o. female who presents to urgent care with concerns for left knee pain x 4 days. Patient reports associated symptoms of mild swelling.  No fevers or chills.   Patient reports pain began without trauma.  She denies any history of falls or injury to her left knee.  Denies any  twisting injury.  Denies any previous injury to the left knee.  She does report popping sensation with movement, as well as worsening pain while attempting to ambulate stairs.  She has been attempting to alleviate pain with OTC analgesics and ice.      Patient also reports with concern for potential UTI.  She symptoms of mild dysuria and vaginal irritation.  Does report urinary frequency.  No abdominal pain.  No flank pain. No fevers.       Review of Systems   Constitutional:  Negative for chills and fever.       PAST MEDICAL HISTORY  Patient Active Problem List    Diagnosis Date Noted    Adjustment disorder 09/20/2022    Screening for cervical cancer 09/20/2022    Dyspepsia 04/28/2022    Neuropathy 04/28/2022    Cervical lymphadenopathy 04/28/2022    Asthma 10/15/2021    IBS (irritable bowel syndrome) 10/15/2021    GERD (gastroesophageal reflux disease) 10/15/2021    Chronic allergic rhinitis 10/15/2021    Sleep apnea 10/15/2021    Adenoma of left adrenal gland 10/15/2021    Fatigue 06/25/2021    Obstructive sleep apnea 06/25/2021    Anxiety 05/28/2021    Migraine 04/16/2021    Chronic diarrhea 04/16/2021    Hypertrophy of tonsils 04/16/2021    Seizures (HCC) 04/16/2021    Snoring 04/16/2021       SURGICAL HISTORY  patient denies any surgical history    ALLERGIES  Allergies   Allergen Reactions    Nickel Unspecified     rash       CURRENT MEDICATIONS  Home Medications       Reviewed by Alo Rivera Ass't (Medical Assistant) on 05/09/25 at  "0822  Med List Status: <None>     Medication Last Dose Status   albuterol 108 (90 Base) MCG/ACT Aero Soln inhalation aerosol Taking Active   DULoxetine (CYMBALTA) 30 MG Cap DR Particles Taking Active   esomeprazole (NEXIUM) 20 MG capsule Taking Active   fluconazole (DIFLUCAN) 150 MG tablet Taking Active   fluconazole (DIFLUCAN) 150 MG tablet Taking Active   fluticasone (FLONASE) 50 MCG/ACT nasal spray Taking Active   levothyroxine (SYNTHROID) 25 MCG Tab Taking Active   medroxyPROGESTERone (PROVERA) 10 MG Tab Taking Active   metFORMIN (GLUCOPHAGE) 500 MG Tab Taking Active   montelukast (SINGULAIR) 10 MG Tab Taking Active   naproxen sodium (ANAPROX) 550 MG tablet Taking Active   ondansetron (ZOFRAN ODT) 4 MG TABLET DISPERSIBLE Taking Active   SUMAtriptan (IMITREX) 50 MG Tab Taking Active   WEGOVY 0.25 MG/0.5ML Solution Auto-injector Pen-injector Taking Active   WEGOVY 0.5 MG/0.5ML Solution Auto-injector Pen-injector Taking Active   WEGOVY 1 MG/0.5ML Solution Auto-injector Pen-injector Taking Active   zonisamide (ZONEGRAN) 100 MG Cap Taking Active                    SOCIAL HISTORY  Social History     Tobacco Use    Smoking status: Never    Smokeless tobacco: Never   Vaping Use    Vaping status: Never Used   Substance and Sexual Activity    Alcohol use: Never    Drug use: Never    Sexual activity: Not on file       FAMILY HISTORY  No family history on file.      Medications, Allergies, and current problem list reviewed today in Epic.     Objective:     /70 (BP Location: Right arm, Patient Position: Sitting, BP Cuff Size: Adult)   Pulse 70   Temp 37.1 °C (98.7 °F) (Temporal)   Resp 18   Ht 1.651 m (5' 5\")   Wt 112 kg (248 lb)   SpO2 95%     Physical Exam  Vitals reviewed.   Constitutional:       General: She is not in acute distress.     Appearance: Normal appearance. She is not ill-appearing or toxic-appearing.   HENT:      Head: Normocephalic.      Nose: Nose normal.      Mouth/Throat:      Mouth: Mucous " membranes are moist.      Pharynx: Oropharynx is clear.   Cardiovascular:      Rate and Rhythm: Normal rate and regular rhythm.      Pulses: Normal pulses.      Heart sounds: Normal heart sounds.   Pulmonary:      Effort: Pulmonary effort is normal.      Breath sounds: Normal breath sounds.   Abdominal:      Tenderness: There is no right CVA tenderness or left CVA tenderness.   Musculoskeletal:      Cervical back: Normal range of motion. No tenderness.      Left knee: Swelling present. No deformity, effusion, erythema or crepitus. Tenderness present over the patellar tendon. Normal alignment, normal meniscus and normal patellar mobility. Normal pulse.   Skin:     General: Skin is warm.   Neurological:      General: No focal deficit present.      Mental Status: She is alert.   Psychiatric:         Mood and Affect: Mood normal.       Lab Results/POC Test Results   Results for orders placed or performed in visit on 05/09/25   POCT Urinalysis    Collection Time: 05/09/25  9:17 AM   Result Value Ref Range    POC Color yellow Negative    POC Appearance clear Negative    POC Glucose neg Negative mg/dL    POC Bilirubin neg Negative mg/dL    POC Ketones neg Negative mg/dL    POC Specific Gravity 1.025 <1.005 - >1.030    POC Blood neg Negative    POC Urine PH 6.0 5.0 - 8.0    POC Protein neg Negative mg/dL    POC Urobiligen 0.2 Negative (0.2) mg/dL    POC Nitrites neg Negative    POC Leukocyte Esterase neg Negative             RADIOLOGY RESULTS   DX-KNEE 3 VIEWS Atraumatic Pain/Swelling/Deformity  Result Date: 5/9/2025 5/9/2025 8:41 AM HISTORY/REASON FOR EXAM:  Left knee pain TECHNIQUE/EXAM DESCRIPTION AND NUMBER OF VIEWS:  3 views of the LEFT knee. COMPARISON: None FINDINGS: BONE MINERALIZATION: Normal. JOINTS: Mild patellofemoral osteoarthritis and small knee effusion. No erosions. FRACTURE: None. DISLOCATION: None. SOFT TISSUES: No mass.     Mild patellofemoral osteoarthritis and small knee effusion. No fracture or  dislocation.           Assessment/Plan:     Diagnosis and associated orders:     1. Acute pain of left knee  DX-KNEE 3 VIEWS Atraumatic Pain/Swelling/Deformity      2. UTI symptoms  POCT Urinalysis    VAGINAL PATHOGENS DNA PANEL      3. Osteoarthritis of left patellofemoral joint  Referral to Sports Medicine      4. Effusion of left knee  Referral to Sports Medicine         Comments/MDM:     POC urine unremarkable.  Pending vaginal pathogen swab.  Patient reports to urgent care with atraumatic left-sided knee pain x 4 days.  No fevers or chills.  Mild swelling.  No redness or warmth.  Upon physical exam patient is alert apparent signs of distress.  Mild swelling.  No deformity or effusion appreciated.  Tenderness to patellar tendon.  No crepitus or abnormal alignment.  Neurovascular intact.  X-ray reveals mild patellofemoral osteoarthritis and small effusion.  Patient notified of results.  Referral placed to sports med for further evaluation and management.  Advised to continue with rest, ice, compression and elevation.  May take Tylenol/Motrin as needed.  Discussed use of compression sleeve for support and comfort.         Differential diagnosis, natural history, supportive care, and indications for immediate follow-up discussed.    Advised the patient to follow-up with the primary care physician for recheck, reevaluation, and consideration of further management.    Please note that this dictation was created using voice recognition software. I have made a reasonable attempt to correct obvious errors, but I expect that there are errors of grammar and possibly content that I did not discover before finalizing the note.    This note was electronically signed by TIMOTHY Valdez

## 2025-05-10 ENCOUNTER — RESULTS FOLLOW-UP (OUTPATIENT)
Dept: URGENT CARE | Facility: PHYSICIAN GROUP | Age: 40
End: 2025-05-10

## 2025-05-15 NOTE — Clinical Note
REFERRAL APPROVAL NOTICE         Sent on May 15, 2025                   Priyanka Carmona  [address] [place] [address]                   Dear Ms. Carmona,    After a careful review of the medical information and benefit coverage, [facility] has processed your referral. See below for additional details.    If applicable, you must be actively enrolled with your insurance for coverage of the authorized service. If you have any questions regarding your coverage, please contact your insurance directly.    REFERRAL INFORMATION   Referral #:  [id number]  Referred-To Department    Referred-By Provider:  Sports Medicine    TIMOTHY Valdez   [address]      [address]  [facility] [address]  [phone] [address] [facility] [place] [id number] [phone]    Referral Start Date:  05/09/2025  Referral End Date:   05/09/2026             SCHEDULING  If you do not already have an appointment, please call [phone] to make an appointment.     MORE INFORMATION  If you do not already have a [facility] account, sign up at: [email or web address]  You can access your medical information, make appointments, see lab results, billing information, and more.  If you have questions regarding this referral, please contact  the [facility] Referrals department at:             [phone]. Monday - Friday 8:00AM - 5:00PM.     Sincerely,    [facility]

## 2025-05-20 ENCOUNTER — OFFICE VISIT (OUTPATIENT)
Dept: SPORTS MEDICINE | Facility: OTHER | Age: 40
End: 2025-05-20
Payer: COMMERCIAL

## 2025-05-20 VITALS
SYSTOLIC BLOOD PRESSURE: 122 MMHG | BODY MASS INDEX: 41.32 KG/M2 | RESPIRATION RATE: 16 BRPM | DIASTOLIC BLOOD PRESSURE: 76 MMHG | TEMPERATURE: 97.6 F | HEART RATE: 59 BPM | HEIGHT: 65 IN | WEIGHT: 248 LBS | OXYGEN SATURATION: 96 %

## 2025-05-20 DIAGNOSIS — M76.52 PATELLAR TENDINITIS, LEFT KNEE: ICD-10-CM

## 2025-05-20 DIAGNOSIS — M22.42 PATELLOFEMORAL CHONDROSIS, LEFT: Primary | ICD-10-CM

## 2025-05-20 PROCEDURE — 3078F DIAST BP <80 MM HG: CPT | Performed by: FAMILY MEDICINE

## 2025-05-20 PROCEDURE — 3074F SYST BP LT 130 MM HG: CPT | Performed by: FAMILY MEDICINE

## 2025-05-20 PROCEDURE — 99214 OFFICE O/P EST MOD 30 MIN: CPT | Performed by: FAMILY MEDICINE

## 2025-05-20 NOTE — PROGRESS NOTES
"Chief Complaint   Patient presents with    Knee Pain     L knee pain      CHIEF COMPLAINT:  Priyanka Carmona female presenting at the request of LEONIE Valdez  for evaluation of knee pain.     Priyanka Carmona is complaining of left knee pain  She has had intermittent anterior knee pain for the past year or so  Since mid 2024  She has had episodes of knee pain which fluctuate in severity  2 weeks ago (first week of May 2025) she had \"worst episode ever  Pain is achy, worse when weightbearing  Can be sharp and feels like there is rubbing or grinding inside her knee associated with clicking which is discomforting and occasionally painful  Pain is at the anterior knee  Pain is non-radiating   Improved with resting, keeping the knee slightly flexed  Aggravated by standing, walking  no prior problems with this area in the past  Pain is 4 out of 10 and can be as severe as 8 out of 10  Prior Treatments: Bracing and seen at urgent care  Prior studies: X-Ray   Medications tried for pain include: Ibuprofen, Tylenol, Biofreeze and Voltaren gel helps some  Mechanical Symptom history: No Locking, Grinding, and Popping which can be painful    Also has issues with the contralateral hip and lumbar spine pain    Desk work, orders parts for the [employer]  Walking for activity    REVIEW OF SYSTEMS  No Nausea, No Vomiting, No Chest Pain, No Shortness of Breath, No Dizziness, No Headache    PAST MEDICAL HISTORY:   History reviewed. No pertinent past medical history.    PMH:  has a past medical history of Diabetes (HCC) and Hypothyroidism.  MEDS: Current Medications[1]  ALLERGIES: Allergies[2]  SURGHX: Past Surgical History[3]  SOCHX:  reports that she has never smoked. She has never used smokeless tobacco. She reports that she does not drink alcohol and does not use drugs.  FH: Family history was reviewed, no pertinent findings to report     PHYSICAL EXAM:  /76 (BP Location: Left arm, Patient Position: Sitting, " "BP Cuff Size: Large adult)   Pulse (!) 59   Temp 36.4 °C (97.6 °F) (Temporal)   Resp 16   Ht 1.651 m (5' 5\")   Wt 112 kg (248 lb)   SpO2 96%   BMI 41.27 kg/m²      obese in no apparent distress, alert and oriented x 3.  Gait: antalgic     RIGHT Knee:  Slight Varus and No Swelling  Range of Motion Intact with some crepitus  Trace effusion  Patellar Medial facet tenderness/MILD  Medial Joint Line Non-tender and NEGATIVE Cassie  Lateral Joint Line Non-tender and NEGATIVE Cassie  Trace Laxity with Varus stress  Trace Laxity with Valgus stress  Lachman's testing is Trace  Posterior Drawer Testing is Trace  The leg is otherwise neurovascularly intact    LEFT Knee:  Slight Varus and No Swelling   Range of Motion Intact with crepitus and fairly significant tenderness at the patellar tendon  Trace effusion  Patellar No tenderness and no apprehension  Medial Joint Line Tenderness and NEGATIVE Cassie  Lateral Joint Line Non-tender and NEGATIVE Cassie  Trace Laxity with Varus stress  Trace Laxity with Valgus stress  Lachman's testing is Trace  Posterior Drawer Testing is Trace  The leg is otherwise neurovascularly intact    Additional Findings: Tight hamstrings    1. Patellofemoral chondrosis, left        2. Patellar tendinitis, left knee          She has had intermittent anterior knee pain for the past year or so  Since mid 2024  She has had episodes of knee pain which fluctuate in severity    Provided knee home exercise program  Recommend 5 minutes Pilates violetta daily    Return in about 6 weeks (around 7/1/2025).  To see how she is doing with the exercises and Pilates violetta daily at that point  Consider LEFT knee intra-articular corticosteroid injection if symptoms worsen or fail to improve                   5/9/2025 8:41 AM     HISTORY/REASON FOR EXAM:  Left knee pain     TECHNIQUE/EXAM DESCRIPTION AND NUMBER OF VIEWS:  3 views of the LEFT knee.     COMPARISON: None     FINDINGS:     BONE MINERALIZATION: " Normal.  JOINTS: Mild patellofemoral osteoarthritis and small knee effusion. No erosions.  FRACTURE: None.  DISLOCATION: None.  SOFT TISSUES: No mass.     IMPRESSION:     Mild patellofemoral osteoarthritis and small knee effusion. No fracture or dislocation.        Exam Ended: 05/09/25  8:52 AM Last Resulted: 05/09/25 10:37 AM     Interpreted in the office today with the patient    Thank you LEONIE Valdez for allowing me to participate in caring for your patient.         [1]   Current Outpatient Medications:     ondansetron (ZOFRAN ODT) 4 MG TABLET DISPERSIBLE, , Disp: , Rfl:     WEGOVY 1 MG/0.5ML Solution Auto-injector Pen-injector, , Disp: , Rfl:     WEGOVY 0.25 MG/0.5ML Solution Auto-injector Pen-injector, , Disp: , Rfl:     WEGOVY 0.5 MG/0.5ML Solution Auto-injector Pen-injector, INJECT 0.5 MG SUBCUTANEOUSLY ONCE A WEEK FOR 28 DAYS, Disp: , Rfl:     fluconazole (DIFLUCAN) 150 MG tablet, If develop yeast infection, take 1 tablet.  If still symptomatic in 3 days, take second tablet., Disp: 2 Tablet, Rfl: 0    DULoxetine (CYMBALTA) 30 MG Cap DR Particles, Take 1 Capsule by mouth every day., Disp: 90 Capsule, Rfl: 3    fluconazole (DIFLUCAN) 150 MG tablet, Take 1 Tablet by mouth every day., Disp: 1 Tablet, Rfl: 0    medroxyPROGESTERone (PROVERA) 10 MG Tab, , Disp: , Rfl:     naproxen sodium (ANAPROX) 550 MG tablet, , Disp: , Rfl:     albuterol 108 (90 Base) MCG/ACT Aero Soln inhalation aerosol, Inhale 1-2 Puffs every four hours as needed for Shortness of Breath (or wheezing)., Disp: 8.5 g, Rfl: 2    montelukast (SINGULAIR) 10 MG Tab, Take 1 Tablet by mouth every day., Disp: 90 Tablet, Rfl: 3    metFORMIN (GLUCOPHAGE) 500 MG Tab, Take 1 Tablet by mouth 3 times a day., Disp: 270 Tablet, Rfl: 3    levothyroxine (SYNTHROID) 25 MCG Tab, Take 1 Tablet by mouth every morning on an empty stomach., Disp: 90 Tablet, Rfl: 3    SUMAtriptan (IMITREX) 50 MG Tab, , Disp: , Rfl:     esomeprazole (NEXIUM) 20 MG capsule,  Take 20 mg by mouth., Disp: , Rfl:     fluticasone (FLONASE) 50 MCG/ACT nasal spray, 2 Sprays., Disp: , Rfl:     zonisamide (ZONEGRAN) 100 MG Cap, Take 100 mg by mouth., Disp: , Rfl:   [2]   Allergies  Allergen Reactions    Nickel Unspecified     rash   [3]   Past Surgical History:  Procedure Laterality Date    PRIMARY C SECTION